# Patient Record
Sex: FEMALE | Race: BLACK OR AFRICAN AMERICAN | NOT HISPANIC OR LATINO | Employment: FULL TIME | URBAN - METROPOLITAN AREA
[De-identification: names, ages, dates, MRNs, and addresses within clinical notes are randomized per-mention and may not be internally consistent; named-entity substitution may affect disease eponyms.]

---

## 2023-10-31 PROBLEM — E11.9 CONTROLLED TYPE 2 DIABETES MELLITUS WITHOUT COMPLICATION, WITHOUT LONG-TERM CURRENT USE OF INSULIN: Status: ACTIVE | Noted: 2023-10-31

## 2023-12-04 PROBLEM — R79.82 ELEVATED C-REACTIVE PROTEIN (CRP): Status: ACTIVE | Noted: 2023-12-04

## 2024-01-08 ENCOUNTER — TELEPHONE (OUTPATIENT)
Dept: FAMILY MEDICINE | Facility: CLINIC | Age: 54
End: 2024-01-08
Payer: COMMERCIAL

## 2024-01-08 DIAGNOSIS — Z12.31 VISIT FOR SCREENING MAMMOGRAM: Primary | ICD-10-CM

## 2024-01-08 NOTE — TELEPHONE ENCOUNTER
----- Message from Cony Bales LPN sent at 1/8/2024  7:33 AM CST -----  Regarding: mammogram  Pt requesting order for mammogram Thanks  ----- Message -----  From: Laila Warren  Sent: 1/5/2024   3:04 PM CST  To: Magdi MAZARIEGOS Staff    .Type:  Mammogram    Caller is requesting to schedule their annual mammogram appointment.  Order is not listed in EPIC.  Please enter order and contact patient to schedule.  Name of Caller:pt  Where would they like the mammogram performed?Bre[facility] center across [facility]  Would the patient rather a call back or a response via [facility]?   Best Call Back Number:[phone]  Additional Information: asking for mmg orders she due

## 2024-01-31 ENCOUNTER — HOSPITAL ENCOUNTER (OUTPATIENT)
Dept: RADIOLOGY | Facility: HOSPITAL | Age: 54
Discharge: HOME | End: 2024-01-31
Attending: NURSE PRACTITIONER
Payer: COMMERCIAL

## 2024-01-31 DIAGNOSIS — Z12.39 ENCOUNTER FOR SCREENING FOR MALIGNANT NEOPLASM OF BREAST, UNSPECIFIED SCREENING MODALITY: ICD-10-CM

## 2024-01-31 PROCEDURE — 77067 SCR MAMMO BI INCL CAD: CPT | Mod: 26,,, | Performed by: RADIOLOGY

## 2024-01-31 PROCEDURE — 77067 SCR MAMMO BI INCL CAD: CPT | Mod: TC

## 2024-01-31 PROCEDURE — 77063 BREAST TOMOSYNTHESIS BI: CPT | Mod: 26,,, | Performed by: RADIOLOGY

## 2024-03-14 ENCOUNTER — LAB VISIT (OUTPATIENT)
Dept: LAB | Facility: HOSPITAL | Age: 54
End: 2024-03-14
Attending: STUDENT IN AN ORGANIZED HEALTH CARE EDUCATION/TRAINING PROGRAM
Payer: COMMERCIAL

## 2024-03-14 DIAGNOSIS — Z79.899 ENCOUNTER FOR LONG-TERM (CURRENT) USE OF OTHER MEDICATIONS: Primary | ICD-10-CM

## 2024-03-14 LAB
ALBUMIN SERPL-MCNC: 3.5 G/DL (ref 3.5–5)
ALBUMIN/GLOB SERPL: 0.9 RATIO (ref 1.1–2)
ALP SERPL-CCNC: 121 UNIT/L (ref 40–150)
ALT SERPL-CCNC: 29 UNIT/L (ref 0–55)
AST SERPL-CCNC: 19 UNIT/L (ref 5–34)
BILIRUB SERPL-MCNC: 0.3 MG/DL
BUN SERPL-MCNC: 9.6 MG/DL (ref 9.8–20.1)
CALCIUM SERPL-MCNC: 9.1 MG/DL (ref 8.4–10.2)
CHLORIDE SERPL-SCNC: 103 MMOL/L (ref 98–107)
CO2 SERPL-SCNC: 28 MMOL/L (ref 22–29)
CREAT SERPL-MCNC: 0.84 MG/DL (ref 0.55–1.02)
ERYTHROCYTE [DISTWIDTH] IN BLOOD BY AUTOMATED COUNT: 13.2 % (ref 11.5–17)
GFR SERPLBLD CREATININE-BSD FMLA CKD-EPI: >60 MLS/MIN/1.73/M2
GLOBULIN SER-MCNC: 4 GM/DL (ref 2.4–3.5)
GLUCOSE SERPL-MCNC: 144 MG/DL (ref 74–100)
HCT VFR BLD AUTO: 42.2 % (ref 37–47)
HGB BLD-MCNC: 13.7 G/DL (ref 12–16)
MCH RBC QN AUTO: 28.3 PG (ref 27–31)
MCHC RBC AUTO-ENTMCNC: 32.5 G/DL (ref 33–36)
MCV RBC AUTO: 87.2 FL (ref 80–94)
NRBC BLD AUTO-RTO: 0 %
PLATELET # BLD AUTO: 314 X10(3)/MCL (ref 130–400)
PMV BLD AUTO: 11.4 FL (ref 7.4–10.4)
POTASSIUM SERPL-SCNC: 4.4 MMOL/L (ref 3.5–5.1)
PROT SERPL-MCNC: 7.5 GM/DL (ref 6.4–8.3)
RBC # BLD AUTO: 4.84 X10(6)/MCL (ref 4.2–5.4)
SODIUM SERPL-SCNC: 141 MMOL/L (ref 136–145)
WBC # SPEC AUTO: 9.61 X10(3)/MCL (ref 4.5–11.5)

## 2024-03-14 PROCEDURE — 86480 TB TEST CELL IMMUN MEASURE: CPT

## 2024-03-14 PROCEDURE — 86803 HEPATITIS C AB TEST: CPT

## 2024-03-14 PROCEDURE — 86706 HEP B SURFACE ANTIBODY: CPT

## 2024-03-14 PROCEDURE — 87340 HEPATITIS B SURFACE AG IA: CPT

## 2024-03-14 PROCEDURE — 85027 COMPLETE CBC AUTOMATED: CPT

## 2024-03-14 PROCEDURE — 80053 COMPREHEN METABOLIC PANEL: CPT

## 2024-03-14 PROCEDURE — 86704 HEP B CORE ANTIBODY TOTAL: CPT

## 2024-03-14 PROCEDURE — 36415 COLL VENOUS BLD VENIPUNCTURE: CPT

## 2024-03-15 LAB
HBV CORE AB SERPL QL IA: NONREACTIVE
HBV SURFACE AB SER-ACNC: 214.88 MIU/ML
HBV SURFACE AB SERPL IA-ACNC: REACTIVE M[IU]/ML
HBV SURFACE AG SERPL QL IA: NONREACTIVE
HCV AB SERPL QL IA: NONREACTIVE

## 2024-03-17 LAB
GAMMA INTERFERON BACKGROUND BLD IA-ACNC: 0.05 IU/ML
M TB IFN-G BLD-IMP: NEGATIVE
M TB IFN-G CD4+ BCKGRND COR BLD-ACNC: 0.08 IU/ML
M TB IFN-G CD4+CD8+ BCKGRND COR BLD-ACNC: 0.07 IU/ML
MITOGEN IGNF BCKGRD COR BLD-ACNC: 9.95 IU/ML

## 2024-05-16 ENCOUNTER — HOSPITAL ENCOUNTER (EMERGENCY)
Facility: HOSPITAL | Age: 54
Discharge: HOME | End: 2024-05-16
Attending: EMERGENCY MEDICINE
Payer: COMMERCIAL

## 2024-05-16 VITALS
HEIGHT: 61 IN | TEMPERATURE: 98 F | RESPIRATION RATE: 22 BRPM | BODY MASS INDEX: 30.22 KG/M2 | HEART RATE: 74 BPM | OXYGEN SATURATION: 100 % | DIASTOLIC BLOOD PRESSURE: 90 MMHG | SYSTOLIC BLOOD PRESSURE: 161 MMHG | WEIGHT: 160.06 LBS

## 2024-05-16 DIAGNOSIS — S49.91XA INJURY OF RIGHT ACROMIOCLAVICULAR JOINT, INITIAL ENCOUNTER: Primary | ICD-10-CM

## 2024-05-16 LAB
OHS QRS DURATION: 74 MS
OHS QTC CALCULATION: 446 MS

## 2024-05-16 PROCEDURE — 93005 ELECTROCARDIOGRAM TRACING: CPT

## 2024-05-16 PROCEDURE — 99284 EMERGENCY DEPT VISIT MOD MDM: CPT | Mod: 25

## 2024-05-16 RX ORDER — MELOXICAM 7.5 MG/1
7.5 TABLET ORAL DAILY
Qty: 20 TABLET | Refills: 0 | Status: SHIPPED | OUTPATIENT
Start: 2024-05-16

## 2024-05-16 NOTE — Clinical Note
"Zonia Beltran" Felix was seen and treated in our emergency department on 5/16/2024.  She may return to work on 05/17/2024.       If you have any questions or concerns, please don't hesitate to call.      BRENDA HERNANDEZ    "

## 2024-05-16 NOTE — ED PROVIDER NOTES
Encounter Date: 5/16/2024       History     Chief Complaint   Patient presents with    Chest Pain     Right sided chest pain radiating to right arm and clavicle x2 days. Denies SOB or injury. No distress.      Pain to her right clavicle that extends into the right shoulder and breast.  She states it has been going on for 2 days.  She denies shortness of breath, nausea and vomiting.  She does not have any medical problems currently.  She has not currently taking any medications.  She denies any fall or trauma.  It hurts to lift her right arm.  She rates her pain 8/10.  Worse with movement.    The history is provided by the patient. No  was used.     Review of patient's allergies indicates:  No Known Allergies  History reviewed. No pertinent past medical history.  History reviewed. No pertinent surgical history.  No family history on file.     Review of Systems   Constitutional:  Negative for fever.   HENT:  Negative for sore throat.    Respiratory:  Negative for shortness of breath.    Cardiovascular:  Negative for chest pain.   Gastrointestinal:  Negative for nausea.   Genitourinary:  Negative for dysuria.   Musculoskeletal:  Positive for arthralgias and myalgias. Negative for back pain.   Skin:  Negative for rash.   Neurological:  Negative for weakness.   Hematological:  Does not bruise/bleed easily.       Physical Exam     Initial Vitals [05/16/24 0848]   BP Pulse Resp Temp SpO2   134/80 77 20 98 °F (36.7 °C) 100 %      MAP       --         Physical Exam    Nursing note and vitals reviewed.  Constitutional: She appears well-developed and well-nourished.   HENT:   Head: Normocephalic and atraumatic.   Eyes: Conjunctivae and EOM are normal. Pupils are equal, round, and reactive to light.   Neck: Neck supple.   Normal range of motion.  Cardiovascular:  Normal rate and regular rhythm.           Pulmonary/Chest: Breath sounds normal. No respiratory distress.   Abdominal: Abdomen is soft. Bowel  sounds are normal. She exhibits no distension. There is no abdominal tenderness.   Musculoskeletal:         General: No edema.      Right shoulder: Tenderness present. Decreased range of motion.      Left shoulder: Normal.      Cervical back: Normal range of motion and neck supple.      Comments: Patient has pain to the anterior shoulder with internal rotation.  Lift-off test to L1 with pain.  Pain to palpation of the clavicle.  Pain to palpation at the AC joint.  Pain to palpation over the lateral and posterior shoulder.     Neurological: She is alert and oriented to person, place, and time. She has normal strength.   Skin: Skin is warm and dry.   Psychiatric: Thought content normal.         ED Course   Procedures  Labs Reviewed - No data to display  EKG Readings: (Independently Interpreted)   Initial Reading: No STEMI. Rhythm: Normal Sinus Rhythm. Heart Rate: 72. Ectopy: No Ectopy. Conduction: Normal. ST Segments: Normal ST Segments. T Waves: Normal. Axis: Normal. Clinical Impression: Normal Sinus Rhythm     ECG Results              EKG 12-lead (Chest Pain) Age >30 (In process)        Collection Time Result Time QRS Duration OHS QTC Calculation    05/16/24 08:51:45 05/16/24 08:59:48 74 446                     In process by Interface, Lab In [facility] (05/16/24 08:59:54)                   Narrative:    Test Reason : R07.9,    Vent. Rate : 074 BPM     Atrial Rate : 074 BPM     P-R Int : 132 ms          QRS Dur : 074 ms      QT Int : 402 ms       P-R-T Axes : 071 079 045 degrees     QTc Int : 446 ms    Normal sinus rhythm  Normal ECG  No previous ECGs available    Referred By:             Confirmed By:                                   Imaging Results              X-Ray Shoulder Complete 2 View Right (Final result)  Result time 05/16/24 09:57:46      Final result by Sekou Pacheco MD (05/16/24 09:57:46)                   Impression:      Elevation of the clavicle in relation to the acromion might indicate a degree  of acromioclavicular injury.    No other significant abnormalities.      Electronically signed by: Sekou Pacheco  Date:    05/16/2024  Time:    09:57               Narrative:    EXAMINATION:  XR SHOULDER COMPLETE 2 OR MORE VIEWS RIGHT    CLINICAL HISTORY:  Pain, unspecified    COMPARISON:  None.    FINDINGS:  There is some elevation of the clavicle in relation to the acromion which may indicate a degree of acromioclavicular injury articular spaces are otherwise preserved with smooth articular surfaces    No acute displaced fractures or dislocations.    Joint spaces preserved.    No blastic or lytic lesions.    Soft tissues within normal limits.                                       X-Ray Chest PA And Lateral (Final result)  Result time 05/16/24 09:54:43      Final result by Sekou Pacheco MD (05/16/24 09:54:43)                   Impression:      No acute chest disease is identified.      Electronically signed by: Sekou Pacheco  Date:    05/16/2024  Time:    09:54               Narrative:    EXAMINATION:  XR CHEST PA AND LATERAL    CLINICAL HISTORY:  pain;, .    FINDINGS:  No alveolar consolidation, effusion, or pneumothorax is seen.   The thoracic aorta is normal  cardiac silhouette, central pulmonary vessels and mediastinum are normal in size and are grossly unremarkable.   visualized osseous structures are grossly unremarkable.                                    X-Rays:   Independently Interpreted Readings:   Other Readings:  No acute fracture or dislocation.    Medications - No data to display  Medical Decision Making  Patient with right shoulder pain for 2 days without injury presents to the ED. x-ray showed no acute injury however x-ray showed mild elevation of the clavicle on the right shoulder x-ray.  Possible AC joint injury.  I will give her an anti-inflammatory and a shoulder sling.  She is to follow up with Orthopedic surgery.    Amount and/or Complexity of Data Reviewed  Radiology: ordered  and independent interpretation performed. Decision-making details documented in ED Course.     Details: No acute fracture or dislocation.  Mild elevation of the right clavicle.    Risk  Prescription drug management.  Risk Details: Risk Details: Given strict ED return precautions. I have spoken with the patient and/or caregivers. I have explained the patient's condition, diagnoses and treatment plan based on the information available to me at this time. I have answered the patient's and/or caregiver's questions and addressed any concerns. The patient and/or caregivers have as good an understanding of the patient's diagnosis, condition and treatment plan as can be expected at this point. The vital signs have been stable. The patient's condition is stable and appropriate for discharge from the emergency department.      The patient will pursue further outpatient evaluation with the primary care physician or other designated or consulting physician as outlined in the discharge instructions. The patient and/or caregivers are agreeable to this plan of care and follow-up instructions have been explained in detail. The patient and/or caregivers have received these instructions in written format and have expressed an understanding of the discharge instructions. The patient and/or caregivers are aware that any significant change in condition or worsening of symptoms should prompt an immediate return to this or the closest emergency department or a call to 911.           Additional MDM:   Differential Diagnosis:   AC joint tear, rotator cuff tear, rotator cuff syndrome                                  It is important that you follow up with your primary care provider or specialist if indicated for further evaluation, workup, and treatment as necessary. The exam and treatment you received in Emergency Department was for an urgent problem and NOT INTENDED AS COMPLETE CARE. It is important that you FOLLOW UP with a doctor for  ongoing care. If your symptoms become WORSE or you DO NOT IMPROVE and you are unable to reach your health care provider, you should RETURN to the Emergency Department    Clinical Impression:  Final diagnoses:  [S49.91XA] Injury of right acromioclavicular joint, initial encounter (Primary)          ED Disposition Condition    Discharge Stable          ED Prescriptions       Medication Sig Dispense Start Date End Date Auth. Provider    meloxicam (MOBIC) 7.5 MG tablet Take 1 tablet (7.5 mg total) by mouth once daily. 20 tablet 5/16/2024 -- Chasidy Brewer FNP          Follow-up Information       Follow up With Specialties Details Why Contact Info    [facility] Call today  [address] [facility] [address] [phone]             Chasidy Brewer FNP  05/16/24 1001

## 2024-05-23 ENCOUNTER — OFFICE VISIT (OUTPATIENT)
Dept: URGENT CARE | Facility: CLINIC | Age: 54
End: 2024-05-23
Payer: COMMERCIAL

## 2024-05-23 VITALS
RESPIRATION RATE: 18 BRPM | TEMPERATURE: 99 F | DIASTOLIC BLOOD PRESSURE: 82 MMHG | OXYGEN SATURATION: 99 % | HEART RATE: 91 BPM | SYSTOLIC BLOOD PRESSURE: 118 MMHG | BODY MASS INDEX: 30.22 KG/M2 | HEIGHT: 61 IN | WEIGHT: 160.06 LBS

## 2024-05-23 DIAGNOSIS — L30.9 HAND DERMATITIS: Primary | ICD-10-CM

## 2024-05-23 PROCEDURE — 99215 OFFICE O/P EST HI 40 MIN: CPT | Mod: PBBFAC | Performed by: FAMILY MEDICINE

## 2024-05-23 PROCEDURE — 99213 OFFICE O/P EST LOW 20 MIN: CPT | Mod: S$PBB,,, | Performed by: FAMILY MEDICINE

## 2024-05-23 NOTE — PROGRESS NOTES
"Subjective:       Patient ID: Zonia Washington is a 53 y.o. female.    Vitals:  height is 5' 1" (1.549 m) and weight is 72.6 kg (160 lb 0.9 oz). Her temperature is 98.7 °F (37.1 °C). Her blood pressure is 118/82 and her pulse is 91. Her respiration is 18 and oxygen saturation is 99%.     Chief Complaint: Rash (Multiple small "bumps" bilat palms x 4days )    Patient presents to urgent care clinic with several days of pustules on bilateral palms.  Asymptomatic, no pruritus, no drainage.  Interestingly, has a history of foot psoriasis, also possible onychomycosis?  Treated by a podiatrist and derm.    All other systems are negative    Chart reviewed    Objective:   Physical Exam   Constitutional: She appears well-developed.  Non-toxic appearance. She does not appear ill. No distress.   Abdominal: Normal appearance.   Neurological: She is alert.   Skin: Skin is not diaphoretic.         Comments: Bilateral hands-rare scattered 1-2 mm size pustules, nontender, no vesicles.    Right foot-psoriasiform changes of the entire plantar surface in a moccasin type pattern, dystrophic nails, no interdigital findings   Nursing note and vitals reviewed.        Assessment:     1. Hand dermatitis            Plan:   Potential etiologies are dyshidrotic eczema, possibly pustular psoriasis, viral, etc..  Patient is currently asymptomatic.  She is followed by a dermatologist.  I encouraged her to monitor these areas, follow-up with her PCP, but return to urgent care for recheck if there is progression or new symptoms development.      Hand dermatitis        Please note: This chart was completed via voice to text dictation. It may contain typographical/word recognition errors. If there are any questions, please contact the provider for final clarification.    "

## 2024-05-28 ENCOUNTER — TELEPHONE (OUTPATIENT)
Dept: FAMILY MEDICINE | Facility: CLINIC | Age: 54
End: 2024-05-28
Payer: COMMERCIAL

## 2024-05-28 ENCOUNTER — OFFICE VISIT (OUTPATIENT)
Dept: ORTHOPEDICS | Facility: CLINIC | Age: 54
End: 2024-05-28
Payer: COMMERCIAL

## 2024-05-28 ENCOUNTER — PATIENT MESSAGE (OUTPATIENT)
Dept: ORTHOPEDICS | Facility: CLINIC | Age: 54
End: 2024-05-28

## 2024-05-28 VITALS
BODY MASS INDEX: 29.44 KG/M2 | DIASTOLIC BLOOD PRESSURE: 71 MMHG | SYSTOLIC BLOOD PRESSURE: 113 MMHG | WEIGHT: 160 LBS | HEIGHT: 62 IN | HEART RATE: 75 BPM

## 2024-05-28 DIAGNOSIS — G89.29 CHEST WALL PAIN, CHRONIC: ICD-10-CM

## 2024-05-28 DIAGNOSIS — S49.91XA INJURY OF RIGHT ACROMIOCLAVICULAR JOINT, INITIAL ENCOUNTER: ICD-10-CM

## 2024-05-28 DIAGNOSIS — R07.89 RIGHT-SIDED CHEST WALL PAIN: Primary | ICD-10-CM

## 2024-05-28 DIAGNOSIS — R07.89 CHEST WALL PAIN, CHRONIC: ICD-10-CM

## 2024-05-28 PROCEDURE — 3074F SYST BP LT 130 MM HG: CPT | Mod: CPTII,,, | Performed by: ORTHOPAEDIC SURGERY

## 2024-05-28 PROCEDURE — 3078F DIAST BP <80 MM HG: CPT | Mod: CPTII,,, | Performed by: ORTHOPAEDIC SURGERY

## 2024-05-28 PROCEDURE — 3008F BODY MASS INDEX DOCD: CPT | Mod: CPTII,,, | Performed by: ORTHOPAEDIC SURGERY

## 2024-05-28 PROCEDURE — 99204 OFFICE O/P NEW MOD 45 MIN: CPT | Mod: ,,, | Performed by: ORTHOPAEDIC SURGERY

## 2024-05-28 PROCEDURE — 1160F RVW MEDS BY RX/DR IN RCRD: CPT | Mod: CPTII,,, | Performed by: ORTHOPAEDIC SURGERY

## 2024-05-28 PROCEDURE — 1159F MED LIST DOCD IN RCRD: CPT | Mod: CPTII,,, | Performed by: ORTHOPAEDIC SURGERY

## 2024-05-28 RX ORDER — GABAPENTIN 300 MG/1
300 CAPSULE ORAL NIGHTLY
Qty: 20 CAPSULE | Refills: 0 | Status: SHIPPED | OUTPATIENT
Start: 2024-05-28

## 2024-05-28 NOTE — PROGRESS NOTES
Orthopaedic Clinic  Orthopedic Clinic Note      Chief Complaint:   Chief Complaint   Patient presents with    Appointment     Patient here today for right shoulder pain since about 5/14/2024. ER visit on 05/16/24 where heart issues were ruled out and she was given a sling to wear for support. She thinks that she may have slept bad that night. She is right hand dominant and the pain keeps her from being able to do anything with that arm. Pain remains 8/10 and is keeping her up at night.      Referring Physician: Chasidy Brewer FNP      History of Present Illness:    This is a 53 y.o. year old female presenting with complaints of right shoulder pain acutely worsening since 05/14/2024.  She denies any injury or trauma to the shoulder.  States that she did have the same symptoms a few months ago that resolved with physical therapy.  Pain is most notable over the right chest wall and extends into the right breast and axilla and over the posterior aspect of the shoulder.  It is causing difficulty with sleeping.  She denies any issues with range of motion of the shoulder.  She has attempted prescribed meloxicam and physical therapy with continued symptoms.  She was evaluated in the ED for the same complaint on 05/16/2024 where cardiac etiology was ruled out.  X-rays of both the chest and right shoulder did not demonstrate any acute pathology.  She was placed in a sling and instructed to follow up as an outpatient with Orthopedics.      Past Medical History:   Diagnosis Date    Mixed hyperlipidemia 10/31/2023    Obesity, unspecified     Psoriasis 5/29/2022    Type 2 diabetes mellitus without complications        Past Surgical History:   Procedure Laterality Date    CHOLECYSTECTOMY      DILATION AND CURETTAGE OF UTERUS      HERNIA REPAIR      HERNIA REPAIR      HYSTERECTOMY         Current Outpatient Medications   Medication Sig    aspirin (ECOTRIN) 81 MG EC tablet Take 81 mg by mouth.    cetirizine (ZYRTEC) 10 MG  tablet Take 10 mg by mouth.    cholecalciferol, vitamin D3, (VITAMIN D3) 10 mcg (400 unit) Tab     diclofenac sodium (VOLTAREN) 1 % Gel Apply 2 g topically 4 (four) times daily as needed (pain/inflammation).    meloxicam (MOBIC) 7.5 MG tablet Take 1 tablet (7.5 mg total) by mouth once daily.    multivit with min-folic acid (ONE-A-DAY WOMEN'S 50 PLUS) 0.4 mg Tab     rosuvastatin (CRESTOR) 5 MG tablet Take 1 tablet (5 mg total) by mouth nightly.    gabapentin (NEURONTIN) 300 MG capsule Take 1 capsule (300 mg total) by mouth every evening.     No current facility-administered medications for this visit.       Review of patient's allergies indicates:   Allergen Reactions    Latex Hives, Itching and Rash       Family History   Problem Relation Name Age of Onset    Diabetes Mother Niecee Felix     Cancer Mother Niecee Felix     Cancer Father Carlos Murphy        Social History     Socioeconomic History    Marital status:    Tobacco Use    Smoking status: Never    Smokeless tobacco: Never   Substance and Sexual Activity    Alcohol use: Yes     Comment: Occasionally    Drug use: Never    Sexual activity: Not Currently     Partners: Male     Birth control/protection: None   Social History Narrative    ** Merged History Encounter **          Social Determinants of Health     Financial Resource Strain: Low Risk  (11/29/2023)    Overall Financial Resource Strain (CARDIA)     Difficulty of Paying Living Expenses: Not hard at all   Food Insecurity: No Food Insecurity (11/29/2023)    Hunger Vital Sign     Worried About Running Out of Food in the Last Year: Never true     Ran Out of Food in the Last Year: Never true   Transportation Needs: No Transportation Needs (11/29/2023)    PRAPARE - Transportation     Lack of Transportation (Medical): No     Lack of Transportation (Non-Medical): No   Physical Activity: Insufficiently Active (11/29/2023)    Exercise Vital Sign     Days of Exercise per Week: 5 days     Minutes of Exercise  "per Session: 10 min   Stress: No Stress Concern Present (11/29/2023)    [employer] - Occupational Stress Questionnaire     Feeling of Stress : Not at all   Housing Stability: Low Risk  (11/29/2023)    Housing Stability Vital Sign     Unable to Pay for Housing in the Last Year: No     Number of Places Lived in the Last Year: 1     Unstable Housing in the Last Year: No           Review of Systems:  All review of systems negative except for those stated in the HPI.    Examination:    Vital Signs:    Vitals:    05/28/24 0812   BP: 113/71   Pulse: 75   Weight: 72.6 kg (160 lb)   Height: 5' 1.81" (1.57 m)       Body mass index is 29.44 kg/m².    Physical Examination:  General: Well-developed, well-nourished.  Neuro: Alert and oriented x 3.  Psych: Normal mood and affect.  Card: Regular rate and rhythm  Resp: Respirations regular and unlabored  Right Shoulder Exam:  No obvious deformity. No medial or lateral scapula winging. Forward flexion to 170 degrees and abduction to 170 degrees and external rotation 80 degrees is and internal rotation is 80 degrees. Negative empty can, Whipple, Drop Arm Test, Thompson, impingement, AC joint tenderness. Negative biceps groove tenderness, Heck´s, Yergason´s, Speed test. Negative Apprehension and Relocation test. 5/5 strength, normal skin appearance and palpable pulses distally. Sensibility normal.      Imaging:  Prior four views of the right shoulder demonstrate questionable AC joint injury.    Assessment: Right-sided chest wall pain  -     gabapentin (NEURONTIN) 300 MG capsule; Take 1 capsule (300 mg total) by mouth every evening.  Dispense: 20 capsule; Refill: 0  -     CT Chest Without Contrast; Future; Expected date: 05/28/2024    Chest wall pain, chronic  -     gabapentin (NEURONTIN) 300 MG capsule; Take 1 capsule (300 mg total) by mouth every evening.  Dispense: 20 capsule; Refill: 0  -     CT Chest Without Contrast; Future; Expected date: " 05/28/2024    Injury of right acromioclavicular joint, initial encounter  -     Ambulatory referral/consult to Orthopedics        Plan:  Prior x-rays were reviewed with the patient.  Unfortunately, her x-ray imaging has been nondiagnostic.  The AC joint injury demonstrated on shoulder radiographs is likely incidental as her symptoms do not fit the usual presentation for this diagnosis.  I am concerned that her symptoms are stemming from some type of chest wall pathology or thoracic spine issue.  For that reason, order entered for CT of the chest.  Prescription routed for gabapentin to serve as both a therapeutic and diagnostic intervention.  She will discontinue her sling.  She will return to clinic for review of CT results once imaging is obtained.  She verbalized understanding of the plan of care with no further questions.    Thony Keen MD personally performed the services described in this documentation, including but not limited to patient's history, physical examination, and assessment and plan of care. All medical record entries made by DHAVAL Veras were performed at his direction and in his presence. The medical record was reviewed and is accurate and complete.         Follow up for Review of CT results.      DISCLAIMER: This note may have been dictated using voice recognition software and may contain grammatical errors.     NOTE: Consult report sent to referring provider via [facility].

## 2024-05-28 NOTE — TELEPHONE ENCOUNTER
Left message confirming pts appt 06/04 @ 9:00    Are there any outstanding tasks in the patients's chart (ex.labs,MM,etc)?  no  Do we have outstanding/pending referrals?  no  Has the patient been seen in and ER,UCC, or been admitted since last visit?  yes  Has the patient seen any other health care provider(doctors) since last visit?  yes  Has the patient had any bloodwork or x-rays done since last visit?   yes   <<-----Click here for Discharge Medication Review

## 2024-05-29 DIAGNOSIS — R91.1 SOLITARY PULMONARY NODULE: Primary | ICD-10-CM

## 2024-05-29 DIAGNOSIS — K76.0 FATTY LIVER: ICD-10-CM

## 2024-05-29 DIAGNOSIS — R91.8 MULTIPLE LUNG NODULES ON CT: ICD-10-CM

## 2024-05-30 ENCOUNTER — TELEPHONE (OUTPATIENT)
Dept: FAMILY MEDICINE | Facility: CLINIC | Age: 54
End: 2024-05-30
Payer: COMMERCIAL

## 2024-05-30 NOTE — TELEPHONE ENCOUNTER
----- Message from Zara Fairbanks MD sent at 5/29/2024  5:54 PM CDT -----  CT chest shows multiple small nodular densities along the periphery of both lower lungs, most consistent with post infectious/inflammatory process and age indeterminate.  A follow-up non-contrast chest CT is recommended in 3 months to assess for stability, orders are in file and I placed a referral to a Pulmonologist (lung specialist). The Radiologist suspects the changes are chronic.  No large pulmonary nodule or mass.  No additional pathology at the chest.  - She has diffuse fatty deposits in her liver, she needs to follow low fat diet, exercise, and 10% weight loss for reversal.   - Remaining CT chest is normal.

## 2024-06-04 ENCOUNTER — OFFICE VISIT (OUTPATIENT)
Dept: FAMILY MEDICINE | Facility: CLINIC | Age: 54
End: 2024-06-04
Payer: COMMERCIAL

## 2024-06-04 ENCOUNTER — CLINICAL SUPPORT (OUTPATIENT)
Dept: FAMILY MEDICINE | Facility: CLINIC | Age: 54
End: 2024-06-04
Attending: FAMILY MEDICINE
Payer: COMMERCIAL

## 2024-06-04 ENCOUNTER — LAB VISIT (OUTPATIENT)
Dept: LAB | Facility: HOSPITAL | Age: 54
End: 2024-06-04
Attending: FAMILY MEDICINE
Payer: COMMERCIAL

## 2024-06-04 ENCOUNTER — TELEPHONE (OUTPATIENT)
Dept: FAMILY MEDICINE | Facility: CLINIC | Age: 54
End: 2024-06-04

## 2024-06-04 VITALS
OXYGEN SATURATION: 99 % | SYSTOLIC BLOOD PRESSURE: 122 MMHG | HEART RATE: 76 BPM | DIASTOLIC BLOOD PRESSURE: 82 MMHG | RESPIRATION RATE: 16 BRPM | WEIGHT: 156 LBS | BODY MASS INDEX: 29.45 KG/M2 | HEIGHT: 61 IN

## 2024-06-04 DIAGNOSIS — E11.9 CONTROLLED TYPE 2 DIABETES MELLITUS WITHOUT COMPLICATION, WITHOUT LONG-TERM CURRENT USE OF INSULIN: Primary | ICD-10-CM

## 2024-06-04 DIAGNOSIS — M47.22 OSTEOARTHRITIS OF SPINE WITH RADICULOPATHY, CERVICAL REGION: ICD-10-CM

## 2024-06-04 DIAGNOSIS — E78.5 HYPERLIPIDEMIA ASSOCIATED WITH TYPE 2 DIABETES MELLITUS: ICD-10-CM

## 2024-06-04 DIAGNOSIS — E11.69 HYPERLIPIDEMIA ASSOCIATED WITH TYPE 2 DIABETES MELLITUS: ICD-10-CM

## 2024-06-04 DIAGNOSIS — E11.9 CONTROLLED TYPE 2 DIABETES MELLITUS WITHOUT COMPLICATION, WITHOUT LONG-TERM CURRENT USE OF INSULIN: ICD-10-CM

## 2024-06-04 DIAGNOSIS — G89.29 CHRONIC RIGHT SHOULDER PAIN: ICD-10-CM

## 2024-06-04 DIAGNOSIS — D75.1 POLYCYTHEMIA: Primary | ICD-10-CM

## 2024-06-04 DIAGNOSIS — M25.511 CHRONIC RIGHT SHOULDER PAIN: ICD-10-CM

## 2024-06-04 LAB
ALBUMIN SERPL-MCNC: 4 G/DL (ref 3.5–5)
ALBUMIN/GLOB SERPL: 0.9 RATIO (ref 1.1–2)
ALP SERPL-CCNC: 149 UNIT/L (ref 40–150)
ALT SERPL-CCNC: 20 UNIT/L (ref 0–55)
ANION GAP SERPL CALC-SCNC: 8 MEQ/L
AST SERPL-CCNC: 19 UNIT/L (ref 5–34)
BASOPHILS # BLD AUTO: 0.04 X10(3)/MCL
BASOPHILS NFR BLD AUTO: 0.4 %
BILIRUB SERPL-MCNC: 0.4 MG/DL
BUN SERPL-MCNC: 15.9 MG/DL (ref 9.8–20.1)
CALCIUM SERPL-MCNC: 9.3 MG/DL (ref 8.4–10.2)
CHLORIDE SERPL-SCNC: 109 MMOL/L (ref 98–107)
CHOLEST SERPL-MCNC: 127 MG/DL
CHOLEST/HDLC SERPL: 3 {RATIO} (ref 0–5)
CK SERPL-CCNC: 50 U/L (ref 29–168)
CO2 SERPL-SCNC: 24 MMOL/L (ref 22–29)
CREAT SERPL-MCNC: 0.91 MG/DL (ref 0.55–1.02)
CREAT/UREA NIT SERPL: 17
EOSINOPHIL # BLD AUTO: 0.21 X10(3)/MCL (ref 0–0.9)
EOSINOPHIL NFR BLD AUTO: 2 %
ERYTHROCYTE [DISTWIDTH] IN BLOOD BY AUTOMATED COUNT: 12.4 % (ref 11.5–17)
EST. AVERAGE GLUCOSE BLD GHB EST-MCNC: 154.2 MG/DL
GFR SERPLBLD CREATININE-BSD FMLA CKD-EPI: >60 ML/MIN/1.73/M2
GLOBULIN SER-MCNC: 4.7 GM/DL (ref 2.4–3.5)
GLUCOSE SERPL-MCNC: 128 MG/DL (ref 74–100)
HBA1C MFR BLD: 7 %
HCT VFR BLD AUTO: 51.6 % (ref 37–47)
HDLC SERPL-MCNC: 46 MG/DL (ref 35–60)
HGB BLD-MCNC: 15.3 G/DL (ref 12–16)
IMM GRANULOCYTES # BLD AUTO: 0.05 X10(3)/MCL (ref 0–0.04)
IMM GRANULOCYTES NFR BLD AUTO: 0.5 %
LDLC SERPL CALC-MCNC: 58 MG/DL (ref 50–140)
LYMPHOCYTES # BLD AUTO: 3.07 X10(3)/MCL (ref 0.6–4.6)
LYMPHOCYTES NFR BLD AUTO: 29.5 %
MCH RBC QN AUTO: 28 PG (ref 27–31)
MCHC RBC AUTO-ENTMCNC: 29.7 G/DL (ref 33–36)
MCV RBC AUTO: 94.3 FL (ref 80–94)
MONOCYTES # BLD AUTO: 0.67 X10(3)/MCL (ref 0.1–1.3)
MONOCYTES NFR BLD AUTO: 6.4 %
NEUTROPHILS # BLD AUTO: 6.36 X10(3)/MCL (ref 2.1–9.2)
NEUTROPHILS NFR BLD AUTO: 61.2 %
NRBC BLD AUTO-RTO: 0 %
PLATELET # BLD AUTO: 310 X10(3)/MCL (ref 130–400)
PLATELETS.RETICULATED NFR BLD AUTO: 10.4 % (ref 0.9–11.2)
PMV BLD AUTO: 12.2 FL (ref 7.4–10.4)
POTASSIUM SERPL-SCNC: 5 MMOL/L (ref 3.5–5.1)
PROT SERPL-MCNC: 8.7 GM/DL (ref 6.4–8.3)
RBC # BLD AUTO: 5.47 X10(6)/MCL (ref 4.2–5.4)
SODIUM SERPL-SCNC: 141 MMOL/L (ref 136–145)
TRIGL SERPL-MCNC: 114 MG/DL (ref 37–140)
VLDLC SERPL CALC-MCNC: 23 MG/DL
WBC # SPEC AUTO: 10.4 X10(3)/MCL (ref 4.5–11.5)

## 2024-06-04 PROCEDURE — 92228 IMG RTA DETC/MNTR DS PHY/QHP: CPT | Mod: 26,,, | Performed by: OPTOMETRIST

## 2024-06-04 PROCEDURE — 3074F SYST BP LT 130 MM HG: CPT | Mod: CPTII,,, | Performed by: FAMILY MEDICINE

## 2024-06-04 PROCEDURE — 80053 COMPREHEN METABOLIC PANEL: CPT

## 2024-06-04 PROCEDURE — 1160F RVW MEDS BY RX/DR IN RCRD: CPT | Mod: CPTII,,, | Performed by: FAMILY MEDICINE

## 2024-06-04 PROCEDURE — 36415 COLL VENOUS BLD VENIPUNCTURE: CPT

## 2024-06-04 PROCEDURE — 3079F DIAST BP 80-89 MM HG: CPT | Mod: CPTII,,, | Performed by: FAMILY MEDICINE

## 2024-06-04 PROCEDURE — 83036 HEMOGLOBIN GLYCOSYLATED A1C: CPT

## 2024-06-04 PROCEDURE — 80061 LIPID PANEL: CPT

## 2024-06-04 PROCEDURE — 3051F HG A1C>EQUAL 7.0%<8.0%: CPT | Mod: CPTII,,, | Performed by: FAMILY MEDICINE

## 2024-06-04 PROCEDURE — 85025 COMPLETE CBC W/AUTO DIFF WBC: CPT

## 2024-06-04 PROCEDURE — 2025F 7 FLD RTA PHOTO W/O RTNOPTHY: CPT | Mod: CPTII,,, | Performed by: OPTOMETRIST

## 2024-06-04 PROCEDURE — 99214 OFFICE O/P EST MOD 30 MIN: CPT | Mod: ,,, | Performed by: FAMILY MEDICINE

## 2024-06-04 PROCEDURE — 82550 ASSAY OF CK (CPK): CPT

## 2024-06-04 PROCEDURE — 3008F BODY MASS INDEX DOCD: CPT | Mod: CPTII,,, | Performed by: FAMILY MEDICINE

## 2024-06-04 PROCEDURE — 1159F MED LIST DOCD IN RCRD: CPT | Mod: CPTII,,, | Performed by: FAMILY MEDICINE

## 2024-06-04 PROCEDURE — 92228 IMG RTA DETC/MNTR DS PHY/QHP: CPT | Mod: TC,,, | Performed by: FAMILY MEDICINE

## 2024-06-04 RX ORDER — FLUCONAZOLE 100 MG/1
100 TABLET ORAL
COMMUNITY

## 2024-06-04 RX ORDER — ROSUVASTATIN CALCIUM 5 MG/1
5 TABLET, COATED ORAL NIGHTLY
Qty: 90 TABLET | Refills: 3 | Status: SHIPPED | OUTPATIENT
Start: 2024-06-04 | End: 2025-06-04

## 2024-06-04 RX ORDER — METHYLPREDNISOLONE 4 MG/1
TABLET ORAL
Qty: 21 EACH | Refills: 0 | Status: SHIPPED | OUTPATIENT
Start: 2024-06-04 | End: 2024-06-18

## 2024-06-04 NOTE — TELEPHONE ENCOUNTER
----- Message from Domingo Padgett sent at 6/4/2024  2:58 PM CDT -----  .Type:  Needs Medical Advice    Who Called: Zonia  Symptoms (please be specific):    How long has patient had these symptoms:    Pharmacy name and phone #:    Would the patient rather a call back or a response via [facility]?   Best Call Back Number: [phone]  Additional Information: She was returning a call back from the nurse re: her blood test results.

## 2024-06-04 NOTE — PROGRESS NOTES
Patient ID: [id number]     Chief Complaint: Diabetes        HPI:     Zonia Washington is a 53 y.o. female here today for a follow up DM II.   - DM II is controlled with diet, asymptomatic, she wears eyeglasses, she needs DM II eye exam today, she does see Dr. Marvin as well. She is due for labs.  She does see Podiatry (Dr. Saenz) for DM II foot care and Dermatology (Dr. Corcoran) for psoriasis treatment, trying different injections, currently on Tremfya.    - HLD is controlled with Rx, no side effects, asymptomatic, she needs a refill of Crestor today.   - Patient complains of neck pain and right shoulder pain radiating to right chest wall and right elbow x 12/2023. She denies injury. Pain is 10/10 on pain scale, worse with movement, she denies popping, reports mild swelling across her collar bone. She was seen at [facility] ER on 05/16/2024 due to these symptoms as well as her Ortho (Dr. Keen) and had CT chest that showed postinfectious/postinflammatory lung nodules, repeat CT chest in 3 months and she is awaiting Pulmonary appointment. She has tried Mobic, Gabapentin, and Voltaren Gel. She has tried PT for 12 weeks without resolution of pain. She has DJD of right shoulder and neck seen on Xray in 12/2023, hasn't had MRI of either.   - Patient is without any other complaints today.         -------------------------------------    Mixed hyperlipidemia    Obesity, unspecified    Psoriasis    Type 2 diabetes mellitus without complications        Past Surgical History:   Procedure Laterality Date    CHOLECYSTECTOMY      DILATION AND CURETTAGE OF UTERUS      HERNIA REPAIR      HERNIA REPAIR      HYSTERECTOMY         Review of patient's allergies indicates:   Allergen Reactions    Latex Hives, Itching and Rash       Outpatient Medications Marked as Taking for the 6/4/24 encounter (Office Visit) with Zara Fairbanks MD   Medication Sig Dispense Refill    aspirin (ECOTRIN) 81 MG EC tablet Take 81 mg by mouth.      cetirizine  (ZYRTEC) 10 MG tablet Take 10 mg by mouth.      cholecalciferol, vitamin D3, (VITAMIN D3) 10 mcg (400 unit) Tab       diclofenac sodium (VOLTAREN) 1 % Gel Apply 2 g topically 4 (four) times daily as needed (pain/inflammation). 100 g 1    fluconazole (DIFLUCAN) 100 MG tablet Take 100 mg by mouth. 3 tablets weekly for five weeks      gabapentin (NEURONTIN) 300 MG capsule Take 1 capsule (300 mg total) by mouth every evening. 20 capsule 0    meloxicam (MOBIC) 7.5 MG tablet Take 1 tablet (7.5 mg total) by mouth once daily. 20 tablet 0    multivit with min-folic acid (ONE-A-DAY WOMEN'S 50 PLUS) 0.4 mg Tab       [DISCONTINUED] rosuvastatin (CRESTOR) 5 MG tablet Take 1 tablet (5 mg total) by mouth nightly. 90 tablet 1       Social History     Socioeconomic History    Marital status:    Tobacco Use    Smoking status: Never    Smokeless tobacco: Never   Substance and Sexual Activity    Alcohol use: Yes     Comment: Occasionally    Drug use: Never    Sexual activity: Not Currently     Partners: Male     Birth control/protection: None   Social History Narrative    ** Merged History Encounter **          Social Determinants of Health     Financial Resource Strain: Low Risk  (11/29/2023)    Overall Financial Resource Strain (CARDIA)     Difficulty of Paying Living Expenses: Not hard at all   Food Insecurity: No Food Insecurity (11/29/2023)    Hunger Vital Sign     Worried About Running Out of Food in the Last Year: Never true     Ran Out of Food in the Last Year: Never true   Transportation Needs: No Transportation Needs (11/29/2023)    PRAPARE - Transportation     Lack of Transportation (Medical): No     Lack of Transportation (Non-Medical): No   Physical Activity: Insufficiently Active (11/29/2023)    Exercise Vital Sign     Days of Exercise per Week: 5 days     Minutes of Exercise per Session: 10 min   Stress: No Stress Concern Present (11/29/2023)    [employer] [place] [employer] - Occupational Stress  "Questionnaire     Feeling of Stress : Not at all   Housing Stability: Low Risk  (11/29/2023)    Housing Stability Vital Sign     Unable to Pay for Housing in the Last Year: No     Number of Places Lived in the Last Year: 1     Unstable Housing in the Last Year: No        Family History   Problem Relation Name Age of Onset    Diabetes Mother Niecee Felix     Cancer Mother Gaileceviraj Washington     Cancer Father Carlos Murphy         Subjective:       Review of Systems:    See HPI for details    Constitutional: Denies Change in appetite. Denies Chills. Denies Fever. Denies Night sweats.  Eye: Denies Blurred vision. Denies Discharge. Denies Eye pain.  ENT: Denies Decreased hearing. Denies Sore throat. Denies Swollen glands.  Respiratory: Denies Cough. Denies Shortness of breath. Denies Shortness of breath with exertion. Denies Wheezing.  Cardiovascular: Denies Chest pain at rest. Denies Chest pain with exertion. Denies Irregular heartbeat. Denies Palpitations.  Gastrointestinal: Denies Abdominal pain. Denies Diarrhea. Denies Nausea. Denies Vomiting. Denies Hematemesis or Hematochezia.  Genitourinary: Denies Dysuria. Denies Urinary frequency. Denies Urinary urgency. Denies Blood in urine.  Endocrine: Denies Cold intolerance. Denies Excessive thirst. Denies Heat intolerance. Denies Weight loss. Denies Weight gain.  Musculoskeletal: Reports Painful joints. Denies Weakness.  Integumentary: Denies Rash. Denies Itching. Denies Dry skin.  Neurologic: Denies Dizziness. Denies Fainting. Denies Headache.  Psychiatric: Denies Depression. Denies Anxiety. Denies Suicidal/Homicidal ideations.    All Other ROS: Negative except as stated in HPI.       Objective:     /82 (BP Location: Left arm, Patient Position: Sitting, BP Method: Medium (Automatic))   Pulse 76   Resp 16   Ht 5' 1" (1.549 m)   Wt 70.8 kg (156 lb)   SpO2 99%   BMI 29.48 kg/m²     Physical Exam    General: Alert and oriented, No acute distress, Obese.    Eye: Pupils are " equal, round and reactive to light, Extraocular movements are intact, Normal conjunctiva.   HENT: Normocephalic, Tympanic membranes are clear, Normal hearing, Oral mucosa is moist, No pharyngeal erythema.   Throat: Pharynx ( Not edematous, No exudate ).   Neck: Supple, Non-tender, No carotid bruit, No lymphadenopathy, No thyromegaly.   Respiratory: Lungs are clear to auscultation, Respirations are non-labored, Breath sounds are equal, Symmetrical chest wall expansion, No chest wall tenderness.   Cardiovascular: Normal rate, Regular rhythm, No murmur, Good pulses equal in all extremities, No edema.   Gastrointestinal: Soft, Non-tender, Non-distended, Normal bowel sounds, No organomegaly.   Genitourinary: No costovertebral angle tenderness.   Musculoskeletal: Normal range of motion, Normal gait. Neck with FROM, NT, no erythema, no effusion, no deformity. Right shoulder with mild TTP along posterior shoulder, mild crepitus, no erythema, no effusion, no deformity.   Neurologic: No focal deficits, Cranial Nerves II-XII are grossly intact.   Psychiatric: Cooperative, Appropriate mood & affect, Normal judgment, Non-suicidal.   Mood and affect: Calm.   Behavior: Relaxed.      *Records from [facility] ER visit on 05/16/2024 and Ortho visit on 05/28/2024 were reviewed and discussed with patient and patient voices understanding.*        Assessment:       ICD-10-CM ICD-9-CM   1. Controlled type 2 diabetes mellitus without complication, without long-term current use of insulin  E11.9 250.00   2. Hyperlipidemia associated with type 2 diabetes mellitus  E11.69 250.80    E78.5 272.4   3. Chronic right shoulder pain  M25.511 719.41    G89.29 338.29   4. Osteoarthritis of spine with radiculopathy, cervical region  M47.22 721.0        Plan:     Problem List Items Addressed This Visit          Endocrine    Controlled type 2 diabetes mellitus without complication, without long-term current use of insulin - Primary    Relevant Orders     Comprehensive Metabolic Panel    CBC Auto Differential    Hemoglobin A1C    Diabetic Eye Screening Photo     Other Visit Diagnoses       Hyperlipidemia associated with type 2 diabetes mellitus        Relevant Medications    rosuvastatin (CRESTOR) 5 MG tablet    Other Relevant Orders    Comprehensive Metabolic Panel    CK    Lipid Panel    Chronic right shoulder pain        Relevant Medications    methylPREDNISolone (MEDROL DOSEPACK) 4 mg tablet    Other Relevant Orders    MRI Shoulder Without Contrast Right    Osteoarthritis of spine with radiculopathy, cervical region        Relevant Medications    methylPREDNISolone (MEDROL DOSEPACK) 4 mg tablet    Other Relevant Orders    MRI Cervical Spine Without Contrast         1. Controlled type 2 diabetes mellitus without complication, without long-term current use of insulin  - Comprehensive Metabolic Panel; Future  - CBC Auto Differential; Future  - Hemoglobin A1C; Future  - Diabetic Eye Screening Photo; Future  - Will treat pending results. Continue Metfomin as prescribed. Keep daily fasting CBG log. Keep appointment for annual eye exam as scheduled. Notify M.D. or ER if CBG >400 or <60, polyuria, polydipsia, or polyphagia.   Lab Results   Component Value Date    HGBA1C 7.0 06/04/2024      Continue   On ACE and Statin according to guidelines.  Follow ADA Diet. Avoid soda, simple sweets, and limit rice/pasta/breads/starches.  Maintain healthy weight with goal BMI <30.  Exercise 5 times per week for 30 minutes per day.  Stressed importance of daily foot exams.  Stressed importance of annual dilated eye exam.     2. Hyperlipidemia associated with type 2 diabetes mellitus  - Comprehensive Metabolic Panel; Future  - CK; Future  - Lipid Panel; Future  - Rx rosuvastatin (CRESTOR) 5 MG tablet; Take 1 tablet (5 mg total) by mouth nightly.  Dispense: 90 tablet; Refill: 3 refilled today; will treat pending results.   Continue  Stressed importance of dietary modifications. Follow a  low cholesterol, low saturated fat diet with less that 200mg of cholesterol a day.  Avoid fried foods and high saturated fats (high saturated fats less than 7% of calories).  Add Flax Seed/Fish Oil supplements to diet. Increase dietary fiber.  Regular exercise can reduce LDL and raise HDL. Stressed importance of physical activity 5 times per week for 30 minutes per day.      3. Chronic right shoulder pain  - MRI Shoulder Without Contrast Right; Future  - Will followup MRI results. Continue Meloxicam and Gabapentin as prescribed. Rx trial of methylPREDNISolone (MEDROL DOSEPACK) 4 mg tablet; use as directed  Dispense: 21 each; Refill: 0 to help with pain and inflammation, discussed with patient that Medrol Dosepak may temporarily increase her blood sugar reading and patient agrees to monitor her glucose readings closely and inform MD or ER if CBG is > 400. Notify M.D. or ER if symptoms persist or worsen, temp >100.4, or any acute illness.      4. Osteoarthritis of spine with radiculopathy, cervical region  - MRI Cervical Spine Without Contrast; Future  - methylPREDNISolone (MEDROL DOSEPACK) 4 mg tablet; use as directed  Dispense: 21 each; Refill: 0  - Same as #3.       Zonia was seen today for diabetes.    Diagnoses and all orders for this visit:    Controlled type 2 diabetes mellitus without complication, without long-term current use of insulin  -     Comprehensive Metabolic Panel; Future  -     CBC Auto Differential; Future  -     Hemoglobin A1C; Future  -     Diabetic Eye Screening Photo; Future    Hyperlipidemia associated with type 2 diabetes mellitus  -     Comprehensive Metabolic Panel; Future  -     CK; Future  -     Lipid Panel; Future  -     rosuvastatin (CRESTOR) 5 MG tablet; Take 1 tablet (5 mg total) by mouth nightly.    Chronic right shoulder pain  -     MRI Shoulder Without Contrast Right; Future  -     methylPREDNISolone (MEDROL DOSEPACK) 4 mg tablet; use as directed    Osteoarthritis of spine with  radiculopathy, cervical region  -     MRI Cervical Spine Without Contrast; Future  -     methylPREDNISolone (MEDROL DOSEPACK) 4 mg tablet; use as directed          Medication List with Changes/Refills   New Medications    METHYLPREDNISOLONE (MEDROL DOSEPACK) 4 MG TABLET    use as directed       Start Date: 6/4/2024  End Date: 6/25/2024   Current Medications    ASPIRIN (ECOTRIN) 81 MG EC TABLET    Take 81 mg by mouth.       Start Date: 10/4/2021 End Date: --    CETIRIZINE (ZYRTEC) 10 MG TABLET    Take 10 mg by mouth.       Start Date: 10/4/2021 End Date: --    CHOLECALCIFEROL, VITAMIN D3, (VITAMIN D3) 10 MCG (400 UNIT) TAB           Start Date: 9/9/2023  End Date: --    DICLOFENAC SODIUM (VOLTAREN) 1 % GEL    Apply 2 g topically 4 (four) times daily as needed (pain/inflammation).       Start Date: 12/4/2023 End Date: --    FLUCONAZOLE (DIFLUCAN) 100 MG TABLET    Take 100 mg by mouth. 3 tablets weekly for five weeks       Start Date: --        End Date: --    GABAPENTIN (NEURONTIN) 300 MG CAPSULE    Take 1 capsule (300 mg total) by mouth every evening.       Start Date: 5/28/2024 End Date: --    LACTOBACILLUS ACIDOPHILUS (ACIDOPHILUS ORAL)    Take by mouth Daily.       Start Date: --        End Date: --    MELOXICAM (MOBIC) 7.5 MG TABLET    Take 1 tablet (7.5 mg total) by mouth once daily.       Start Date: 5/16/2024 End Date: --    MULTIVIT WITH MIN-FOLIC ACID (ONE-A-DAY WOMEN'S 50 PLUS) 0.4 MG TAB           Start Date: 10/6/2023 End Date: --   Changed and/or Refilled Medications    Modified Medication Previous Medication    ROSUVASTATIN (CRESTOR) 5 MG TABLET rosuvastatin (CRESTOR) 5 MG tablet       Take 1 tablet (5 mg total) by mouth nightly.    Take 1 tablet (5 mg total) by mouth nightly.       Start Date: 6/4/2024  End Date: 6/4/2025    Start Date: 9/7/2023  End Date: 6/4/2024          Follow up in about 6 months (around 12/4/2024) for Wellness.

## 2024-06-04 NOTE — PATIENT INSTRUCTIONS
Cuauhtemoc Brar,     If you are due for any health screening(s) below please notify me so we can arrange them to be ordered and scheduled. Most healthy patients at your age complete them, but you are free to accept or refuse.     If you can't do it, I'll definitely understand. If you can, I'd certainly appreciate it!    Tests to Keep You Healthy    Mammogram: Met on 2/1/2024  Eye Exam: ORDERED BUT NOT SCHEDULED  Colon Cancer Screening: Met on 2/11/2024  Last HbA1c < 8 (12/04/2023): Yes      Your diabetic retinal eye exam is due     Diabetes is the #1 cause of blindness in the [place] - early detection before signs or symptoms develop can prevent debilitating blindness.     Our records indicate that you may be overdue for your annual diabetic eye exam. Eye screening can help identify patients at risk for developing vision loss which is common in diabetes. This simple screening is an important step to keeping you healthy and preventing complications from diabetes.     This recommended diabetic eye exam should take place once per year and can prevent and treat diabetes complications in the eye before developing symptoms. This can be done with a special camera is used to take photographs of the back of your eye without having to dilate them, or you can see an eye doctor for a full dilated exam.     If you recently had your yearly diabetic eye exam performed outside of [facility], please let your Health care team know so that they can update your health record.

## 2024-06-04 NOTE — TELEPHONE ENCOUNTER
----- Message from Zara Fairbanks MD sent at 6/4/2024 12:27 PM CDT -----  Cholesterol is well controlled, LDL: 58, normal <100, continue current treatment plan, recheck CMP, FLP, CPK in 12/2024. Diabetes is controlled, HgA1C: 7.0%, was 6.6% previously, goal is <7.0%, continue current diabetes treatment plan, recheck CMP, HgA1C in 09/2024. Remaining labs are stable from previous.

## 2024-06-05 ENCOUNTER — PATIENT MESSAGE (OUTPATIENT)
Dept: FAMILY MEDICINE | Facility: CLINIC | Age: 54
End: 2024-06-05
Payer: COMMERCIAL

## 2024-06-05 ENCOUNTER — TELEPHONE (OUTPATIENT)
Dept: FAMILY MEDICINE | Facility: CLINIC | Age: 54
End: 2024-06-05
Payer: COMMERCIAL

## 2024-06-05 NOTE — TELEPHONE ENCOUNTER
----- Message from Zara Fairbanks MD sent at 6/4/2024  5:32 PM CDT -----  Blood appears to be a little thick, was normal 2 months ago, not sure if this is due to her Tremfya or some other cause. I placed orders for additional labs for further evaluation, which includes blood work and urine testing, patient can have for at her earliest convenience.

## 2024-06-05 NOTE — PROGRESS NOTES
Zonia Washington is a 53 y.o. female here for a diabetic eye screening with non-dilated fundus photos per Dr Fairbanks.    Patient cooperative?: Yes  Small pupils?: No  Last eye exam: NA    For exam results, see Encounter Report.

## 2024-06-07 ENCOUNTER — LAB VISIT (OUTPATIENT)
Dept: LAB | Facility: HOSPITAL | Age: 54
End: 2024-06-07
Attending: FAMILY MEDICINE
Payer: COMMERCIAL

## 2024-06-07 DIAGNOSIS — D75.1 POLYCYTHEMIA: ICD-10-CM

## 2024-06-07 LAB
BACTERIA #/AREA URNS AUTO: ABNORMAL /HPF
BILIRUB UR QL STRIP.AUTO: NEGATIVE
CLARITY UR: CLEAR
COLOR UR AUTO: ABNORMAL
GLUCOSE UR QL STRIP: NORMAL
HGB UR QL STRIP: NEGATIVE
KETONES UR QL STRIP: NEGATIVE
LEUKOCYTE ESTERASE UR QL STRIP: 500
MUCOUS THREADS URNS QL MICRO: ABNORMAL /LPF
NITRITE UR QL STRIP: NEGATIVE
PH UR STRIP: 5.5 [PH]
PROT UR QL STRIP: NEGATIVE
RBC #/AREA URNS AUTO: ABNORMAL /HPF
SP GR UR STRIP.AUTO: 1.01 (ref 1–1.03)
SQUAMOUS #/AREA URNS LPF: ABNORMAL /HPF
UROBILINOGEN UR STRIP-ACNC: NORMAL
WBC #/AREA URNS AUTO: ABNORMAL /HPF

## 2024-06-07 PROCEDURE — 82668 ASSAY OF ERYTHROPOIETIN: CPT

## 2024-06-07 PROCEDURE — 81001 URINALYSIS AUTO W/SCOPE: CPT

## 2024-06-07 PROCEDURE — 87077 CULTURE AEROBIC IDENTIFY: CPT

## 2024-06-07 PROCEDURE — 36415 COLL VENOUS BLD VENIPUNCTURE: CPT

## 2024-06-08 LAB — EPO SERPL-ACNC: 21.6 MIU/ML (ref 2.6–18.5)

## 2024-06-09 LAB — BACTERIA UR CULT: ABNORMAL

## 2024-06-11 ENCOUNTER — TELEPHONE (OUTPATIENT)
Dept: FAMILY MEDICINE | Facility: CLINIC | Age: 54
End: 2024-06-11
Payer: COMMERCIAL

## 2024-06-11 ENCOUNTER — HOSPITAL ENCOUNTER (OUTPATIENT)
Dept: RADIOLOGY | Facility: HOSPITAL | Age: 54
Discharge: HOME | End: 2024-06-11
Attending: FAMILY MEDICINE
Payer: COMMERCIAL

## 2024-06-11 DIAGNOSIS — M47.22 OSTEOARTHRITIS OF SPINE WITH RADICULOPATHY, CERVICAL REGION: Primary | ICD-10-CM

## 2024-06-11 DIAGNOSIS — M25.511 CHRONIC RIGHT SHOULDER PAIN: ICD-10-CM

## 2024-06-11 DIAGNOSIS — A49.9 BACTERIAL UTI: ICD-10-CM

## 2024-06-11 DIAGNOSIS — G47.19 EXCESSIVE DAYTIME SLEEPINESS: ICD-10-CM

## 2024-06-11 DIAGNOSIS — M47.22 OSTEOARTHRITIS OF SPINE WITH RADICULOPATHY, CERVICAL REGION: ICD-10-CM

## 2024-06-11 DIAGNOSIS — R06.83 SNORING: ICD-10-CM

## 2024-06-11 DIAGNOSIS — G89.29 CHRONIC RIGHT SHOULDER PAIN: ICD-10-CM

## 2024-06-11 DIAGNOSIS — D75.1 ERYTHROPOIETIN POLYCYTHEMIA: ICD-10-CM

## 2024-06-11 DIAGNOSIS — N39.0 BACTERIAL UTI: ICD-10-CM

## 2024-06-11 PROCEDURE — 72141 MRI NECK SPINE W/O DYE: CPT | Mod: TC

## 2024-06-11 PROCEDURE — 73221 MRI JOINT UPR EXTREM W/O DYE: CPT | Mod: TC,RT

## 2024-06-11 RX ORDER — CIPROFLOXACIN 500 MG/1
500 TABLET ORAL EVERY 12 HOURS
Qty: 14 TABLET | Refills: 0 | Status: SHIPPED | OUTPATIENT
Start: 2024-06-11 | End: 2024-06-18

## 2024-06-11 NOTE — TELEPHONE ENCOUNTER
----- Message from Brianna Heredia LPN sent at 6/11/2024  4:00 PM CDT -----  Spoke with patient about her results.  She states that she was told at one time she did snore at night.  She also states that she does not sleep well. Did you want to order a sleep study?  ----- Message -----  From: Domingo Padgett  Sent: 6/11/2024   3:08 PM CDT  To: Magdi MAZARIEGOS Staff    .Type:  Needs Medical Advice    Who Called: Zonia  Symptoms (please be specific):    How long has patient had these symptoms:    Pharmacy name and phone #:    Would the patient rather a call back or a response via [facility]?   Best Call Back Number: [phone]  Additional Information: Patient was returning a call back from the office she missed.

## 2024-06-11 NOTE — TELEPHONE ENCOUNTER
----- Message from Zara Fairbanks MD sent at 6/11/2024  1:12 PM CDT -----  Final urine culture is suggestive of bacterial urinary tract infection, I sent a prescription for an antibiotic called Cipro to take as directed until the course is completed, will need to repeat urinalysis in 2 weeks to ensure resolution of infection. Erythropoietin (EPO) level is elevated. Erythropoietin (zz-yopd-gjj-POY-uh-tin) is a hormone that your kidneys primarily produce. Erythropoietin (EPO) helps your body maintain a healthy amount of red blood cells (erythrocytes). Inappropriately high levels of erythropoietin can cause high levels of red blood cells. Another name for high levels of red blood cells is polycythemia.   What causes high levels of EPO?   - Long-term (chronic) exposure to low levels of oxygen can increase your EPO levels. This may be due to chronic smoking or living in a high-altitude environment where air oxygen levels are lower. Elevated EPO from a high-altitude environment is normal and appropriately high.  - Anemia may not result from kidney disease and still cause high EPO levels. Anemia happens when you don't have enough red blood cells or your red blood cells don't work as they should. It can cause high levels of EPO because your kidneys sense that you don't have enough red blood cells so they release extra EPO. This is a normal and appropriately high level of EPO.  - Sleep apnea can cause elevated EPO levels, please advise if any snoring or non-restorative sleep, so that sleep study can be ordered.   - In rare cases, certain tumors can also cause your kidneys to release inappropriately excessive EPO. I ordered a CT abdomen/pelvis for evaluation of her kidneys and abdominal organs.     Remaining labs are essentially normal.

## 2024-06-12 ENCOUNTER — TELEPHONE (OUTPATIENT)
Dept: FAMILY MEDICINE | Facility: CLINIC | Age: 54
End: 2024-06-12
Payer: COMMERCIAL

## 2024-06-12 NOTE — TELEPHONE ENCOUNTER
----- Message from Zara Fairbanks MD sent at 6/11/2024  5:01 PM CDT -----  MRI C-spine (neck) shows   Multilevel cervical spondylosis. Cervical spondylosis is a general term for age-related wear and tear affecting the spinal disks in your neck. As the disks dehydrate and shrink, signs of osteoarthritis develop, including bony projections along the edges of bones (bone spurs). Most people experience no symptoms. When symptoms do occur, they typically include pain and stiffness in the neck.  Sometimes, cervical spondylosis results in a narrowing of the spinal canal within the bones of the spine (the vertebrae). The spinal canal is the space inside the vertebrae that the spinal cord and the nerve roots pass through to reach the rest of the body. If the spinal cord or nerve roots become pinched, you might experience:  - Tingling, numbness and weakness in the arms, hands, legs or feet  - Lack of coordination and difficulty walking  - Loss of bladder or bowel control    - Since she has completed physical therapy, I placed a referral to pain management for evaluation and treatment, which may or may not include injections.

## 2024-06-13 ENCOUNTER — TELEPHONE (OUTPATIENT)
Dept: FAMILY MEDICINE | Facility: CLINIC | Age: 54
End: 2024-06-13
Payer: COMMERCIAL

## 2024-06-13 NOTE — TELEPHONE ENCOUNTER
----- Message from Zara Fairbanks MD sent at 6/12/2024  6:30 PM CDT -----  MRI right shoulder shows a high-grade partial-thickness rim rent type tear is present to the distal supraspinatus tendon and foot plate, which is a rotator cuff muscle. The remainder of the rotator cuff is intact. Degeneration (arthritis) changed in her labrum. Remaining MRI right shoulder is normal. Please forward results to her Ortho (Dr. Keen) for review/treatment recommendations.

## 2024-06-18 ENCOUNTER — OFFICE VISIT (OUTPATIENT)
Dept: ORTHOPEDICS | Facility: CLINIC | Age: 54
End: 2024-06-18
Payer: COMMERCIAL

## 2024-06-18 VITALS
DIASTOLIC BLOOD PRESSURE: 74 MMHG | SYSTOLIC BLOOD PRESSURE: 117 MMHG | HEIGHT: 61 IN | WEIGHT: 156.06 LBS | HEART RATE: 71 BPM | BODY MASS INDEX: 29.47 KG/M2

## 2024-06-18 DIAGNOSIS — M54.12 CERVICAL RADICULOPATHY: Primary | ICD-10-CM

## 2024-06-18 DIAGNOSIS — M75.101 ROTATOR CUFF SYNDROME OF RIGHT SHOULDER: ICD-10-CM

## 2024-06-18 PROCEDURE — 3078F DIAST BP <80 MM HG: CPT | Mod: CPTII,,, | Performed by: ORTHOPAEDIC SURGERY

## 2024-06-18 PROCEDURE — 3008F BODY MASS INDEX DOCD: CPT | Mod: CPTII,,, | Performed by: ORTHOPAEDIC SURGERY

## 2024-06-18 PROCEDURE — 3074F SYST BP LT 130 MM HG: CPT | Mod: CPTII,,, | Performed by: ORTHOPAEDIC SURGERY

## 2024-06-18 PROCEDURE — 99214 OFFICE O/P EST MOD 30 MIN: CPT | Mod: ,,, | Performed by: ORTHOPAEDIC SURGERY

## 2024-06-18 PROCEDURE — 1159F MED LIST DOCD IN RCRD: CPT | Mod: CPTII,,, | Performed by: ORTHOPAEDIC SURGERY

## 2024-06-18 PROCEDURE — 3051F HG A1C>EQUAL 7.0%<8.0%: CPT | Mod: CPTII,,, | Performed by: ORTHOPAEDIC SURGERY

## 2024-06-18 RX ORDER — PREDNISONE 10 MG/1
TABLET ORAL
COMMUNITY
Start: 2024-06-06

## 2024-06-18 RX ORDER — ACITRETIN 10 MG/1
CAPSULE ORAL
COMMUNITY
Start: 2024-06-07

## 2024-06-18 NOTE — PROGRESS NOTES
[facility]  Orthopedic Clinic Note      Chief Complaint:   Chief Complaint   Patient presents with    Results     Patient here today for f/u imaging results. Patient notified of CT results via portal and followed up with PCP, who then ordered an MRI right shoulder and MRI cervical spine. Dr. Fairbanks advised pt to follow up with us for those results. She states her shoulder pain is not as bad as it was and is a 1/10 but says she is taking medication rx (prednisone) prescribed by derm for psoriasis which she thinks is helping the shoulder pain.      Referring Physician: No ref. provider found      History of Present Illness:    This is a 53 y.o. year old female presenting with complaints of right shoulder pain acutely worsening since 05/14/2024.  She denies any injury or trauma to the shoulder.  States that she did have the same symptoms a few months ago that resolved with physical therapy.  Pain is most notable over the right chest wall and extends into the right breast and axilla and over the posterior aspect of the shoulder.  It is causing difficulty with sleeping.  She denies any issues with range of motion of the shoulder.  She has attempted prescribed meloxicam and physical therapy with continued symptoms.  She was evaluated in the ED for the same complaint on 05/16/2024 where cardiac etiology was ruled out.  X-rays of both the chest and right shoulder did not demonstrate any acute pathology.  She was placed in a sling and instructed to follow up as an outpatient with Orthopedics.  06/18/2024 this patient comes in today stating that the chest wall symptoms that she had when I initially saw her have resolved.  The CT scan of her chest was demonstrated some chronic changes in her lungs.  She is still having some mild shoulder pain and her PCP ordered a MRI of her cervical spine and her shoulder.  The shoulder MRI on demonstrates a small rent tear in his shoulder that I am not too concerned about.  The  cervical MRI does show some spondylosis.      Past Medical History:   Diagnosis Date    Mixed hyperlipidemia 10/31/2023    Obesity, unspecified     Psoriasis 5/29/2022    Type 2 diabetes mellitus without complications        Past Surgical History:   Procedure Laterality Date    CHOLECYSTECTOMY      DILATION AND CURETTAGE OF UTERUS      HERNIA REPAIR      HERNIA REPAIR      HYSTERECTOMY         Current Outpatient Medications   Medication Sig    acitretin (SORIATANE) 10 MG capsule Take by mouth.    aspirin (ECOTRIN) 81 MG EC tablet Take 81 mg by mouth.    cetirizine (ZYRTEC) 10 MG tablet Take 10 mg by mouth.    cholecalciferol, vitamin D3, (VITAMIN D3) 10 mcg (400 unit) Tab     ciprofloxacin HCl (CIPRO) 500 MG tablet Take 1 tablet (500 mg total) by mouth every 12 (twelve) hours. for 7 days    diclofenac sodium (VOLTAREN) 1 % Gel Apply 2 g topically 4 (four) times daily as needed (pain/inflammation).    fluconazole (DIFLUCAN) 100 MG tablet Take 100 mg by mouth. 3 tablets weekly for five weeks    gabapentin (NEURONTIN) 300 MG capsule Take 1 capsule (300 mg total) by mouth every evening.    Lactobacillus acidophilus (ACIDOPHILUS ORAL) Take by mouth Daily.    meloxicam (MOBIC) 7.5 MG tablet Take 1 tablet (7.5 mg total) by mouth once daily.    multivit with min-folic acid (ONE-A-DAY WOMEN'S 50 PLUS) 0.4 mg Tab     predniSONE (DELTASONE) 10 MG tablet Take by mouth.    rosuvastatin (CRESTOR) 5 MG tablet Take 1 tablet (5 mg total) by mouth nightly.     No current facility-administered medications for this visit.       Review of patient's allergies indicates:   Allergen Reactions    Latex Hives, Itching and Rash       Family History   Problem Relation Name Age of Onset    Diabetes Mother Niecee Felix     Cancer Mother Niecee Felix     Cancer Father Carlos Espinoza        Social History     Socioeconomic History    Marital status:    Tobacco Use    Smoking status: Never    Smokeless tobacco: Never   Substance and Sexual  "Activity    Alcohol use: Yes     Comment: Occasionally    Drug use: Never    Sexual activity: Not Currently     Partners: Male     Birth control/protection: None   Social History Narrative    ** Merged History Encounter **          Social Determinants of Health     Financial Resource Strain: Low Risk  (11/29/2023)    Overall Financial Resource Strain (CARDIA)     Difficulty of Paying Living Expenses: Not hard at all   Food Insecurity: No Food Insecurity (11/29/2023)    Hunger Vital Sign     Worried About Running Out of Food in the Last Year: Never true     Ran Out of Food in the Last Year: Never true   Transportation Needs: No Transportation Needs (11/29/2023)    PRAPARE - Transportation     Lack of Transportation (Medical): No     Lack of Transportation (Non-Medical): No   Physical Activity: Insufficiently Active (11/29/2023)    Exercise Vital Sign     Days of Exercise per Week: 5 days     Minutes of Exercise per Session: 10 min   Stress: No Stress Concern Present (11/29/2023)    [employer] - Occupational Stress Questionnaire     Feeling of Stress : Not at all   Housing Stability: Low Risk  (11/29/2023)    Housing Stability Vital Sign     Unable to Pay for Housing in the Last Year: No     Number of Places Lived in the Last Year: 1     Unstable Housing in the Last Year: No           Review of Systems:  All review of systems negative except for those stated in the HPI.    Examination:    Vital Signs:    Vitals:    06/18/24 0851   BP: 117/74   Pulse: 71   Weight: 70.8 kg (156 lb 1.4 oz)   Height: 5' 1" (1.549 m)       Body mass index is 29.49 kg/m².    Physical Examination:  General: Well-developed, well-nourished.  Neuro: Alert and oriented x 3.  Psych: Normal mood and affect.  Card: Regular rate and rhythm  Resp: Respirations regular and unlabored  Right Shoulder Exam:  No obvious deformity. No medial or lateral scapula winging. Forward flexion to 170 degrees and abduction to 170 " degrees and external rotation 80 degrees is and internal rotation is 80 degrees. Negative empty can, Whipple, Drop Arm Test, Thompson, impingement, AC joint tenderness. Negative biceps groove tenderness, Heck´s, Yergason´s, Speed test. Negative Apprehension and Relocation test. 5/5 strength, normal skin appearance and palpable pulses distally. Sensibility normal.      Imaging:  Prior four views of the right shoulder demonstrate questionable AC joint injury.    Assessment: Rotator cuff syndrome of right shoulder          Plan:  I believe most of her issues are secondary to cervical spondylosis.  She is already scheduled to see a spine specialist.  She will come back to see me if her symptoms do not improve by treatment from the spine specialist.  She verbalized understanding of the plan of care with no further questions.    Thony Keen MD personally performed the services described in this documentation, including but not limited to patient's history, physical examination, and assessment and plan of care. All medical record entries made by DHAVAL Veras were performed at his direction and in his presence. The medical record was reviewed and is accurate and complete.         No follow-ups on file.      DISCLAIMER: This note may have been dictated using voice recognition software and may contain grammatical errors.     NOTE: Consult report sent to referring provider via [employer] EMR.

## 2024-06-21 ENCOUNTER — TELEPHONE (OUTPATIENT)
Dept: FAMILY MEDICINE | Facility: CLINIC | Age: 54
End: 2024-06-21
Payer: COMMERCIAL

## 2024-06-21 NOTE — TELEPHONE ENCOUNTER
----- Message from Marta Oreilly sent at 6/21/2024 10:32 AM CDT -----  Regarding: home sleep study  .Type:  Needs Medical Advice    Who Called: home sleep delivered  Symptoms (please be specific):    How long has patient had these symptoms:    Pharmacy name and phone #:    Would the patient rather a call back or a response via [facility]?   Best Call Back Number: [phone]  Additional Information: Signature on ordered - state that your ordering sleep study  Also office visit notes

## 2024-06-25 ENCOUNTER — OFFICE VISIT (OUTPATIENT)
Dept: PAIN MEDICINE | Facility: CLINIC | Age: 54
End: 2024-06-25
Payer: COMMERCIAL

## 2024-06-25 VITALS
DIASTOLIC BLOOD PRESSURE: 82 MMHG | WEIGHT: 156 LBS | HEART RATE: 80 BPM | BODY MASS INDEX: 29.45 KG/M2 | HEIGHT: 61 IN | SYSTOLIC BLOOD PRESSURE: 125 MMHG

## 2024-06-25 DIAGNOSIS — M47.812 CERVICAL SPONDYLOSIS: Primary | ICD-10-CM

## 2024-06-25 DIAGNOSIS — M47.22 OSTEOARTHRITIS OF SPINE WITH RADICULOPATHY, CERVICAL REGION: ICD-10-CM

## 2024-06-25 DIAGNOSIS — M48.02 FORAMINAL STENOSIS OF CERVICAL REGION: ICD-10-CM

## 2024-06-25 PROCEDURE — 3008F BODY MASS INDEX DOCD: CPT | Mod: CPTII,,, | Performed by: NURSE PRACTITIONER

## 2024-06-25 PROCEDURE — 3079F DIAST BP 80-89 MM HG: CPT | Mod: CPTII,,, | Performed by: NURSE PRACTITIONER

## 2024-06-25 PROCEDURE — 3051F HG A1C>EQUAL 7.0%<8.0%: CPT | Mod: CPTII,,, | Performed by: NURSE PRACTITIONER

## 2024-06-25 PROCEDURE — 99205 OFFICE O/P NEW HI 60 MIN: CPT | Mod: ,,, | Performed by: NURSE PRACTITIONER

## 2024-06-25 PROCEDURE — 1159F MED LIST DOCD IN RCRD: CPT | Mod: CPTII,,, | Performed by: NURSE PRACTITIONER

## 2024-06-25 PROCEDURE — 1160F RVW MEDS BY RX/DR IN RCRD: CPT | Mod: CPTII,,, | Performed by: NURSE PRACTITIONER

## 2024-06-25 PROCEDURE — 3074F SYST BP LT 130 MM HG: CPT | Mod: CPTII,,, | Performed by: NURSE PRACTITIONER

## 2024-06-25 NOTE — PROGRESS NOTES
[facility]    Subjective:     Chief Complaint: Osteoarthritis (OA spine w/radiculopathy cervical, starts from on top of Rt breast radiates around to shoulder blade, no prior injury or sx, pain started May 14,2024 referral from Dr Fairbanks, not taking pain meds, has recent imaging in chart. C/O pain level 5)    Referred by: Zara Fairbanks, *     History of Present Illness: Zonia Washington is a 53 y.o. female presents today for a new referral for osteoarthritis in the spine with radiculopathy and pain cervical region of spine.  Patient had right arm pain starting May 14th that occurred spontaneously.  She was evaluated by her primary physician who sent her to ortho to see Dr. Thony guy even though there was some change in her right rotator cuff he ruled out the majority of her right arm pain was not coming from her rotator cuff instead it was originating from her neck.  Review of her cervical MRI shows multi-level disc osteophyte complexes in the summary it states multilevel of spondylitic changes.  Patient completed 12 weeks of physical therapy that reduced her right arm and hand pain by about 50% however she is has ongoing pain to the back of her neck as well.  She also completes home exercises and reports having some benefit using the 10s unit during physical therapy.  She has no history of spinal procedures, pacemaker or defibrillator.  She does have pertinent past medical history of psoriasis with infusion of Tremfya.  Additionally she takes aspirin 81 mg.  Pain will elevate when driving and causes some insomnia.  Once she finds a comfortable position she stays there as her pain will elevate to a 8/10.  She denies any saddle anesthesia, fecal incontinence and urinary retention.  Her pain will reduce slightly when she stops the activity and uses heat she will also takes some Tylenol as needed.    Vital signs:   Visit Vitals  /82 (BP Location: Right arm, Patient Position: Sitting, BP  "Method: Medium (Automatic))   Pulse 80   Ht 5' 1" (1.549 m)   Wt 70.8 kg (156 lb)   BMI 29.48 kg/m²      Vitals:    06/25/24 1006   PainSc:   5             Interventional Pain History  None    ROS: Neck, back and leg pain    Cervical MRI 2024:   MRI CERVICAL SPINE WITHOUT CONTRAST     CLINICAL HISTORY:  Neck pain, chronic, degenerative changes on xray;  Other spondylosis with radiculopathy, cervical region     TECHNIQUE:  Multiplanar, multisequence MR images of the cervical spine were acquired without the administration of contrast.     COMPARISON:  Cervical spine radiographs 12/04/2023.     FINDINGS:  CORD: Normal size and signal.  No syrinx.  Cervicomedullary junction is normal.     ALIGNMENT: Normal.  Lateral masses of C1 and C2 are congruent.     BONES: Vertebral body heights are maintained.  No aggressive bone marrow signal.     PARASPINAL AREA: Normal.     CERVICAL DISC LEVELS:     C2-C3: Unremarkable.     C3-C4: Broad-based disc osteophyte more pronounced on the right causing mild central canal stenosis and moderate right foraminal stenosis.  The left foramina is patent.     C4-C5: Broad-based disc osteophyte causing mild central canal stenosis and mild bilateral foraminal stenosis.     C5-C6: Unremarkable.     C6-C7: Shallow broad based disc osteophyte causing mild central canal stenosis.  No significant foraminal stenosis.     C7-T1: Unremarkable.     Impression:     Multilevel cervical spondylosis as detailed on a level by level basis.        Electronically signed by:Armond Perales  Date:                                            06/11/2024  Time:                                           15:48    Lumbar spine :   None obtained    Objective:        Physical Exam  General: Well developed; normal weight; A&O x 3; No anxiety/depression; NAD  Mental Status: Oriented to person, palce and time. Displays appropriate mood & affect.  Head: Norm cephalic and atraumatic  Neck:  No cervical paraspinal banding.  Full " range of motion with lateral turning and cervical flexion +extension.equal hand  bilaterally  Eyes: normal conjunctiva, normal lids, normal pupils  ENT and mouth: normal external ear, nose, and no lesions noted on the lips.  Respiratory: Symmetrical, Unlabored. No dyspnea  CV: normal rhythm and rate. No peripheral edema.   Abdomen: Non-distended    Extremities:  Gen: No cyanosis or tenderness to palpation bilateral upper and lower extremities  Skin: Warm, pink, dry, no rashes, no lesions on the lumbar spine  Strength: 5/5 motor strength bilateral upper and lower extremities  ROM: Full ROM in bilateral knees and ankles without pain or instability.    Neuro:  Gait: no altalgic lean, normal toe and heel raise. Independent ambulator.  DTR's: 2+ in bilateral patella  Sensory: Intact to light touch bilateral  upper and lower extremities    Spine: Normal lordosis. mild scoliosis  L-spine ROM: Full ROM to flexion, extension, bilateral rotation,   Straight Leg Raise: neg bilaterally  SI Joint: No tenderness to palpation bilaterally.      Assessment:     Zonia Washington is a 53 y.o. female presents today for a new referral for osteoarthritis in the spine with radiculopathy and pain cervical region of spine.  Patient had right arm pain starting May 14th that occurred spontaneously.  She was evaluated by her primary physician who sent her to ortho to see Dr. Thony guy even though there was some change in her right rotator cuff he ruled out the majority of her right arm pain was not coming from her rotator cuff instead it was originating from her neck.  Review of her cervical MRI shows multi-level disc osteophyte complexes in the summary it states multilevel of spondylitic changes.  Patient completed 12 weeks of physical therapy that reduced her right arm and hand pain by about 50% however she is has ongoing pain to the back of her neck as well.  She also completes home exercises and reports having some benefit using the  10s unit during physical therapy.  She has no history of spinal procedures, pacemaker or defibrillator.  She does have pertinent past medical history of psoriasis with infusion of Tremfya.  Additionally she takes aspirin 81 mg.  Pain will elevate when driving and causes some insomnia.  Once she finds a comfortable position she stays there as her pain will elevate to a 8/10.  She denies any saddle anesthesia, fecal incontinence and urinary retention.  Her pain will reduce slightly when she stops the activity and uses heat she will also takes some Tylenol as needed.     Plan of Care (discussed with Dr. Esquivel)  CA C7-T1  Request radiologist review to confirm levels in MRI C spine with facet arthropathy/spondylosis. Current  MRI C Spine imaging notes summary multi levels of cervical spondylosis;however, each cervical level notes cervical disc osteophysts.Once this is addended, I will review for spondylosis.   Hold  all blood thiners  Receives Tremfya  every 8 weeks. Hold one week prior to injrection.     Encounter Diagnoses   Name Primary?    Osteoarthritis of spine with radiculopathy, cervical region     Cervical spondylosis Yes    Foraminal stenosis of cervical region          Plan:       Zonia was seen today for osteoarthritis.    Diagnoses and all orders for this visit:    Cervical spondylosis    Osteoarthritis of spine with radiculopathy, cervical region  -     Ambulatory referral/consult to Pain Clinic    Foraminal stenosis of cervical region               Past Medical History:   Diagnosis Date    Mixed hyperlipidemia 10/31/2023    Obesity, unspecified     Psoriasis 5/29/2022    Type 2 diabetes mellitus without complications        Past Surgical History:   Procedure Laterality Date    CHOLECYSTECTOMY      DILATION AND CURETTAGE OF UTERUS      HERNIA REPAIR      HERNIA REPAIR      HYSTERECTOMY         Family History   Problem Relation Name Age of Onset    Diabetes Mother Niecee Felix     Cancer Mother Niecee  Felix     Cancer Father Carlos Murphy        Social History     Socioeconomic History    Marital status:    Tobacco Use    Smoking status: Never    Smokeless tobacco: Never   Substance and Sexual Activity    Alcohol use: Yes     Comment: Occasionally    Drug use: Never    Sexual activity: Not Currently     Partners: Male     Birth control/protection: None   Social History Narrative    ** Merged History Encounter **          Social Determinants of Health     Financial Resource Strain: Low Risk  (11/29/2023)    Overall Financial Resource Strain (CARDIA)     Difficulty of Paying Living Expenses: Not hard at all   Food Insecurity: No Food Insecurity (11/29/2023)    Hunger Vital Sign     Worried About Running Out of Food in the Last Year: Never true     Ran Out of Food in the Last Year: Never true   Transportation Needs: No Transportation Needs (11/29/2023)    PRAPARE - Transportation     Lack of Transportation (Medical): No     Lack of Transportation (Non-Medical): No   Physical Activity: Insufficiently Active (11/29/2023)    Exercise Vital Sign     Days of Exercise per Week: 5 days     Minutes of Exercise per Session: 10 min   Stress: No Stress Concern Present (11/29/2023)    [employer] - Occupational Stress Questionnaire     Feeling of Stress : Not at all   Housing Stability: Low Risk  (11/29/2023)    Housing Stability Vital Sign     Unable to Pay for Housing in the Last Year: No     Number of Places Lived in the Last Year: 1     Unstable Housing in the Last Year: No       Current Outpatient Medications   Medication Sig Dispense Refill    acitretin (SORIATANE) 10 MG capsule Take by mouth.      aspirin (ECOTRIN) 81 MG EC tablet Take 81 mg by mouth.      cetirizine (ZYRTEC) 10 MG tablet Take 10 mg by mouth.      cholecalciferol, vitamin D3, (VITAMIN D3) 10 mcg (400 unit) Tab       diclofenac sodium (VOLTAREN) 1 % Gel Apply 2 g topically 4 (four) times daily as needed  (pain/inflammation). 100 g 1    fluconazole (DIFLUCAN) 100 MG tablet Take 100 mg by mouth. 3 tablets weekly for five weeks      gabapentin (NEURONTIN) 300 MG capsule Take 1 capsule (300 mg total) by mouth every evening. 20 capsule 0    Lactobacillus acidophilus (ACIDOPHILUS ORAL) Take by mouth Daily.      meloxicam (MOBIC) 7.5 MG tablet Take 1 tablet (7.5 mg total) by mouth once daily. 20 tablet 0    multivit with min-folic acid (ONE-A-DAY WOMEN'S 50 PLUS) 0.4 mg Tab       predniSONE (DELTASONE) 10 MG tablet Take by mouth.      rosuvastatin (CRESTOR) 5 MG tablet Take 1 tablet (5 mg total) by mouth nightly. 90 tablet 3     No current facility-administered medications for this visit.       Review of patient's allergies indicates:   Allergen Reactions    Latex Hives, Itching and Rash

## 2024-06-27 ENCOUNTER — TELEPHONE (OUTPATIENT)
Dept: FAMILY MEDICINE | Facility: CLINIC | Age: 54
End: 2024-06-27
Payer: COMMERCIAL

## 2024-06-27 NOTE — TELEPHONE ENCOUNTER
Received fax from [facility] stating to please addendum the last office note stating or discussing pts sleep issues. Issues to discuss in office note     - Loud Snoring  - Gasping for air  - Restless Sleep  - Witness Apneic Events  - Excessive Daytime Fatigue    Please advise, Sleep referral was ordered 06/11/2024  Thanks

## 2024-06-28 ENCOUNTER — HOSPITAL ENCOUNTER (OUTPATIENT)
Dept: RADIOLOGY | Facility: HOSPITAL | Age: 54
Discharge: HOME | End: 2024-06-28
Attending: FAMILY MEDICINE
Payer: COMMERCIAL

## 2024-06-28 DIAGNOSIS — N75.0 BARTHOLIN'S GLAND CYST: Primary | ICD-10-CM

## 2024-06-28 DIAGNOSIS — D75.1 ERYTHROPOIETIN POLYCYTHEMIA: ICD-10-CM

## 2024-06-28 PROCEDURE — 74178 CT ABD&PLV WO CNTR FLWD CNTR: CPT | Mod: TC

## 2024-06-28 PROCEDURE — 25500020 PHARM REV CODE 255: Performed by: FAMILY MEDICINE

## 2024-06-28 RX ADMIN — IOHEXOL 99 ML: 350 INJECTION, SOLUTION INTRAVENOUS at 08:06

## 2024-06-28 RX ADMIN — DIATRIZOATE MEGLUMINE AND DIATRIZOATE SODIUM 30 ML: 600; 100 SOLUTION ORAL; RECTAL at 08:06

## 2024-07-01 ENCOUNTER — TELEPHONE (OUTPATIENT)
Dept: FAMILY MEDICINE | Facility: CLINIC | Age: 54
End: 2024-07-01
Payer: COMMERCIAL

## 2024-07-01 NOTE — TELEPHONE ENCOUNTER
----- Message from Kirstie Figueroa sent at 7/1/2024  9:27 AM CDT -----  .Who Called: Zonia Washington    Patient is returning phone call    Who Left Message for Patient: Cony  Does the patient know what this is regarding?:Labs      Preferred Method of Contact: Phone Call  Patient's Preferred Phone Number on File: [phone]   Best Call Back Number, if different:  Additional Information: Pt.states returning phone call. Nothing listed in chart.

## 2024-07-01 NOTE — TELEPHONE ENCOUNTER
Patient notified of results and recommendation. Patient denies any discomfort or irritation to the area.  Will contact her Ob-gyn if she develops any discomfort.

## 2024-07-01 NOTE — TELEPHONE ENCOUNTER
----- Message from Zara Fairbanks MD sent at 6/28/2024 12:46 PM CDT -----  CT abdomen/pelvis shows a cyst on her right labia and her left labia called a Bartholin gland cyst. A Bartholin cyst is usually harmless, although it can become painful and irritating.  In most cases, the cyst heals on its own or with a combination of at-home care, antibiotics, and drainage by a GYN. Please advise if patient has any pain or symptoms for these cysts. Remaining CT abdomen/pelvis is normal.

## 2024-07-05 ENCOUNTER — APPOINTMENT (OUTPATIENT)
Dept: LAB | Facility: HOSPITAL | Age: 54
End: 2024-07-05
Attending: STUDENT IN AN ORGANIZED HEALTH CARE EDUCATION/TRAINING PROGRAM
Payer: COMMERCIAL

## 2024-07-05 DIAGNOSIS — L50.9 URTICARIA, UNSPECIFIED: Primary | ICD-10-CM

## 2024-07-05 LAB
ALBUMIN SERPL-MCNC: 3.8 G/DL (ref 3.5–5)
ALBUMIN/GLOB SERPL: 1 RATIO (ref 1.1–2)
ALP SERPL-CCNC: 113 UNIT/L (ref 40–150)
ALT SERPL-CCNC: 21 UNIT/L (ref 0–55)
ANION GAP SERPL CALC-SCNC: 10 MEQ/L
AST SERPL-CCNC: 12 UNIT/L (ref 5–34)
BILIRUB SERPL-MCNC: 0.4 MG/DL
BUN SERPL-MCNC: 12.9 MG/DL (ref 9.8–20.1)
CALCIUM SERPL-MCNC: 9.7 MG/DL (ref 8.4–10.2)
CHLORIDE SERPL-SCNC: 105 MMOL/L (ref 98–107)
CHOLEST SERPL-MCNC: 159 MG/DL
CHOLEST/HDLC SERPL: 3 {RATIO} (ref 0–5)
CO2 SERPL-SCNC: 28 MMOL/L (ref 22–29)
CREAT SERPL-MCNC: 0.88 MG/DL (ref 0.55–1.02)
CREAT/UREA NIT SERPL: 15
GFR SERPLBLD CREATININE-BSD FMLA CKD-EPI: >60 ML/MIN/1.73/M2
GLOBULIN SER-MCNC: 3.9 GM/DL (ref 2.4–3.5)
GLUCOSE SERPL-MCNC: 124 MG/DL (ref 74–100)
HDLC SERPL-MCNC: 55 MG/DL (ref 35–60)
LDLC SERPL CALC-MCNC: 85 MG/DL (ref 50–140)
POTASSIUM SERPL-SCNC: 4.1 MMOL/L (ref 3.5–5.1)
PROT SERPL-MCNC: 7.7 GM/DL (ref 6.4–8.3)
SODIUM SERPL-SCNC: 143 MMOL/L (ref 136–145)
TRIGL SERPL-MCNC: 97 MG/DL (ref 37–140)
VLDLC SERPL CALC-MCNC: 19 MG/DL

## 2024-07-05 PROCEDURE — 80061 LIPID PANEL: CPT

## 2024-07-05 PROCEDURE — 80053 COMPREHEN METABOLIC PANEL: CPT

## 2024-07-05 PROCEDURE — 36415 COLL VENOUS BLD VENIPUNCTURE: CPT

## 2024-07-24 ENCOUNTER — HOSPITAL ENCOUNTER (EMERGENCY)
Facility: HOSPITAL | Age: 54
Discharge: HOME | End: 2024-07-24
Attending: EMERGENCY MEDICINE
Payer: COMMERCIAL

## 2024-07-24 VITALS
WEIGHT: 159 LBS | OXYGEN SATURATION: 99 % | HEIGHT: 61 IN | SYSTOLIC BLOOD PRESSURE: 128 MMHG | HEART RATE: 82 BPM | BODY MASS INDEX: 30.02 KG/M2 | RESPIRATION RATE: 20 BRPM | DIASTOLIC BLOOD PRESSURE: 82 MMHG | TEMPERATURE: 98 F

## 2024-07-24 DIAGNOSIS — R42 DIZZINESS: ICD-10-CM

## 2024-07-24 DIAGNOSIS — U07.1 COVID: Primary | ICD-10-CM

## 2024-07-24 DIAGNOSIS — R51.9 ACUTE NONINTRACTABLE HEADACHE, UNSPECIFIED HEADACHE TYPE: ICD-10-CM

## 2024-07-24 LAB
ALBUMIN SERPL-MCNC: 4 G/DL (ref 3.5–5)
ALBUMIN/GLOB SERPL: 0.9 RATIO (ref 1.1–2)
ALP SERPL-CCNC: 132 UNIT/L (ref 40–150)
ALT SERPL-CCNC: 36 UNIT/L (ref 0–55)
AMPHET UR QL SCN: NEGATIVE
ANION GAP SERPL CALC-SCNC: 10 MEQ/L
AST SERPL-CCNC: 22 UNIT/L (ref 5–34)
BACTERIA #/AREA URNS AUTO: ABNORMAL /HPF
BARBITURATE SCN PRESENT UR: NEGATIVE
BASOPHILS # BLD AUTO: 0.02 X10(3)/MCL
BASOPHILS NFR BLD AUTO: 0.2 %
BENZODIAZ UR QL SCN: NEGATIVE
BILIRUB SERPL-MCNC: 0.4 MG/DL
BILIRUB UR QL STRIP.AUTO: NEGATIVE
BUN SERPL-MCNC: 6.3 MG/DL (ref 9.8–20.1)
CALCIUM SERPL-MCNC: 10.3 MG/DL (ref 8.4–10.2)
CANNABINOIDS UR QL SCN: NEGATIVE
CHLORIDE SERPL-SCNC: 110 MMOL/L (ref 98–107)
CLARITY UR: CLEAR
CO2 SERPL-SCNC: 25 MMOL/L (ref 22–29)
COCAINE UR QL SCN: NEGATIVE
COLOR UR AUTO: ABNORMAL
CREAT SERPL-MCNC: 0.85 MG/DL (ref 0.55–1.02)
CREAT/UREA NIT SERPL: 7
EOSINOPHIL # BLD AUTO: 0.04 X10(3)/MCL (ref 0–0.9)
EOSINOPHIL NFR BLD AUTO: 0.3 %
ERYTHROCYTE [DISTWIDTH] IN BLOOD BY AUTOMATED COUNT: 13.1 % (ref 11.5–17)
FENTANYL UR QL SCN: NEGATIVE
FLUAV AG UPPER RESP QL IA.RAPID: NOT DETECTED
FLUBV AG UPPER RESP QL IA.RAPID: NOT DETECTED
GFR SERPLBLD CREATININE-BSD FMLA CKD-EPI: >60 ML/MIN/1.73/M2
GLOBULIN SER-MCNC: 4.6 GM/DL (ref 2.4–3.5)
GLUCOSE SERPL-MCNC: 186 MG/DL (ref 74–100)
GLUCOSE UR QL STRIP: ABNORMAL
HCT VFR BLD AUTO: 48.3 % (ref 37–47)
HGB BLD-MCNC: 15.4 G/DL (ref 12–16)
HGB UR QL STRIP: NEGATIVE
HOLD SPECIMEN: NORMAL
HOLD SPECIMEN: NORMAL
HYALINE CASTS #/AREA URNS LPF: ABNORMAL /LPF
IMM GRANULOCYTES # BLD AUTO: 0.05 X10(3)/MCL (ref 0–0.04)
IMM GRANULOCYTES NFR BLD AUTO: 0.4 %
KETONES UR QL STRIP: ABNORMAL
LEUKOCYTE ESTERASE UR QL STRIP: 75
LYMPHOCYTES # BLD AUTO: 1.92 X10(3)/MCL (ref 0.6–4.6)
LYMPHOCYTES NFR BLD AUTO: 16.7 %
MCH RBC QN AUTO: 28 PG (ref 27–31)
MCHC RBC AUTO-ENTMCNC: 31.9 G/DL (ref 33–36)
MCV RBC AUTO: 87.8 FL (ref 80–94)
MDMA UR QL SCN: NEGATIVE
MONOCYTES # BLD AUTO: 0.55 X10(3)/MCL (ref 0.1–1.3)
MONOCYTES NFR BLD AUTO: 4.8 %
MUCOUS THREADS URNS QL MICRO: ABNORMAL /LPF
NEUTROPHILS # BLD AUTO: 8.95 X10(3)/MCL (ref 2.1–9.2)
NEUTROPHILS NFR BLD AUTO: 77.6 %
NITRITE UR QL STRIP: NEGATIVE
NRBC BLD AUTO-RTO: 0 %
OHS QRS DURATION: 74 MS
OHS QTC CALCULATION: 453 MS
OPIATES UR QL SCN: NEGATIVE
PCP UR QL: NEGATIVE
PH UR STRIP: 6.5 [PH]
PH UR: 6.5 [PH] (ref 3–11)
PLATELET # BLD AUTO: 365 X10(3)/MCL (ref 130–400)
PMV BLD AUTO: 11.8 FL (ref 7.4–10.4)
POTASSIUM SERPL-SCNC: 3.9 MMOL/L (ref 3.5–5.1)
PROT SERPL-MCNC: 8.6 GM/DL (ref 6.4–8.3)
PROT UR QL STRIP: ABNORMAL
RBC # BLD AUTO: 5.5 X10(6)/MCL (ref 4.2–5.4)
RBC #/AREA URNS AUTO: ABNORMAL /HPF
SARS-COV-2 RNA RESP QL NAA+PROBE: DETECTED
SODIUM SERPL-SCNC: 145 MMOL/L (ref 136–145)
SP GR UR STRIP.AUTO: 1.01 (ref 1–1.03)
SPECIFIC GRAVITY, URINE AUTO (.000) (OHS): 1.01 (ref 1–1.03)
SQUAMOUS #/AREA URNS LPF: ABNORMAL /HPF
UROBILINOGEN UR STRIP-ACNC: NORMAL
WBC # BLD AUTO: 11.53 X10(3)/MCL (ref 4.5–11.5)
WBC #/AREA URNS AUTO: ABNORMAL /HPF

## 2024-07-24 PROCEDURE — 87086 URINE CULTURE/COLONY COUNT: CPT

## 2024-07-24 PROCEDURE — 93005 ELECTROCARDIOGRAM TRACING: CPT

## 2024-07-24 PROCEDURE — 85025 COMPLETE CBC W/AUTO DIFF WBC: CPT

## 2024-07-24 PROCEDURE — 80053 COMPREHEN METABOLIC PANEL: CPT

## 2024-07-24 PROCEDURE — 99285 EMERGENCY DEPT VISIT HI MDM: CPT | Mod: 25

## 2024-07-24 PROCEDURE — 63600175 PHARM REV CODE 636 W HCPCS

## 2024-07-24 PROCEDURE — 0240U COVID/FLU A&B PCR: CPT

## 2024-07-24 PROCEDURE — 25000003 PHARM REV CODE 250

## 2024-07-24 PROCEDURE — 80307 DRUG TEST PRSMV CHEM ANLYZR: CPT

## 2024-07-24 PROCEDURE — 96360 HYDRATION IV INFUSION INIT: CPT

## 2024-07-24 PROCEDURE — 81001 URINALYSIS AUTO W/SCOPE: CPT | Mod: XB

## 2024-07-24 RX ORDER — MECLIZINE HYDROCHLORIDE 25 MG/1
25 TABLET ORAL
Status: COMPLETED | OUTPATIENT
Start: 2024-07-24 | End: 2024-07-24

## 2024-07-24 RX ORDER — ONDANSETRON 4 MG/1
4 TABLET, ORALLY DISINTEGRATING ORAL EVERY 8 HOURS PRN
Qty: 15 TABLET | Refills: 0 | Status: SHIPPED | OUTPATIENT
Start: 2024-07-24 | End: 2024-07-29

## 2024-07-24 RX ORDER — MECLIZINE HYDROCHLORIDE 25 MG/1
25 TABLET ORAL 3 TIMES DAILY PRN
Qty: 20 TABLET | Refills: 0 | Status: SHIPPED | OUTPATIENT
Start: 2024-07-24

## 2024-07-24 RX ORDER — ONDANSETRON 4 MG/1
4 TABLET, ORALLY DISINTEGRATING ORAL
Status: COMPLETED | OUTPATIENT
Start: 2024-07-24 | End: 2024-07-24

## 2024-07-24 RX ADMIN — SODIUM CHLORIDE, POTASSIUM CHLORIDE, SODIUM LACTATE AND CALCIUM CHLORIDE 1000 ML: 600; 310; 30; 20 INJECTION, SOLUTION INTRAVENOUS at 09:07

## 2024-07-24 RX ADMIN — MECLIZINE HYDROCHLORIDE 25 MG: 25 TABLET ORAL at 08:07

## 2024-07-24 RX ADMIN — ONDANSETRON 4 MG: 4 TABLET, ORALLY DISINTEGRATING ORAL at 08:07

## 2024-07-24 NOTE — Clinical Note
"Zonia Beltran" Felix was seen and treated in our emergency department on 7/24/2024.  She may return to work on 07/29/2024.       If you have any questions or concerns, please don't hesitate to call.      Jackie Tobar, DEYSI"

## 2024-07-24 NOTE — ED PROVIDER NOTES
"Encounter Date: 7/24/2024       History     Chief Complaint   Patient presents with    Headache     Reports headache and dizziness since Tuesday. Symptoms progressed since Tuesday and this morning she vomited.      52 y/o female with PMH of DM, HLD, Psoriasis, presents to [facility] ER with a complaint of dizziness for two days. The dizziness is present when she is moving or laying flat. Her dizziness is described as the "room spinning". Staying still and propping herself up on two pillows helps her dizziness, movement makes it worse. Associated symptoms include mild frontal headache, photopia, nausea, and three episodes of vomiting. Her symptoms began two days ago and have worsened over the last twelve hours. She denies fever, recent infections, cough, cold, congestion, rhinorrhea, vision changes, chest pain sob, unilateral weakness. No other complaints    The history is provided by the patient.     Review of patient's allergies indicates:   Allergen Reactions    Latex Hives, Itching and Rash     Past Medical History:   Diagnosis Date    Mixed hyperlipidemia 10/31/2023    Obesity, unspecified     Psoriasis 5/29/2022    Type 2 diabetes mellitus without complications      Past Surgical History:   Procedure Laterality Date    CHOLECYSTECTOMY      DILATION AND CURETTAGE OF UTERUS      HERNIA REPAIR      HERNIA REPAIR      HYSTERECTOMY       Family History   Problem Relation Name Age of Onset    Diabetes Mother Niecee Felix     Cancer Mother Niecee Felix     Cancer Father Carlos Murphy      Social History     Tobacco Use    Smoking status: Never    Smokeless tobacco: Never   Substance Use Topics    Alcohol use: Yes     Comment: Occasionally    Drug use: Never     Review of Systems   Constitutional: Negative.    HENT: Negative.     Eyes: Negative.    Respiratory: Negative.     Cardiovascular: Negative.    Gastrointestinal:  Positive for nausea and vomiting. Negative for abdominal distention, abdominal pain, anal bleeding, blood in " stool, constipation, diarrhea and rectal pain.        Three episode of vomiting the last twelve hours   Endocrine: Negative.    Genitourinary: Negative.    Musculoskeletal: Negative.    Skin: Negative.    Allergic/Immunologic: Negative.    Neurological:  Positive for dizziness and headaches. Negative for tremors, seizures, syncope, facial asymmetry, speech difficulty, weakness, light-headedness and numbness.        Dizziness x two days   Psychiatric/Behavioral: Negative.     All other systems reviewed and are negative.      Physical Exam     Initial Vitals [07/24/24 0732]   BP Pulse Resp Temp SpO2   137/84 80 20 97.6 °F (36.4 °C) 97 %      MAP       --         Physical Exam    Nursing note and vitals reviewed.  Constitutional: Vital signs are normal. She appears well-developed and well-nourished. She is not diaphoretic. She is cooperative.  Non-toxic appearance. She does not have a sickly appearance. She does not appear ill. No distress.   HENT:   Head: Normocephalic and atraumatic.   Right Ear: Hearing normal.   Left Ear: Hearing normal.   Nose: Nose normal.   Mouth/Throat: Uvula is midline, oropharynx is clear and moist and mucous membranes are normal.   Eyes: Conjunctivae and EOM are normal. Pupils are equal, round, and reactive to light.   Neck: Trachea normal and phonation normal. Neck supple.   Normal range of motion.  Cardiovascular:  Normal rate, regular rhythm, normal heart sounds, intact distal pulses and normal pulses.           Pulmonary/Chest: Effort normal and breath sounds normal. No accessory muscle usage. No tachypnea and no bradypnea. No respiratory distress. She has no decreased breath sounds. She has no wheezes. She has no rhonchi. She has no rales. She exhibits no tenderness.   Normal effort, symmetrical chest rise and fall, no accessory muscle use. Bilateral clear breath sounds in all fields anterior and posterior.      Abdominal: Abdomen is soft. Bowel sounds are normal. She exhibits no  distension. There is no abdominal tenderness.   Abdomen is soft, nontender, no peritoneal signs. Tolerating PO fluids.    There is no rebound.   Musculoskeletal:         General: Normal range of motion.      Cervical back: Normal range of motion and neck supple.     Neurological: She is alert and oriented to person, place, and time. She has normal strength. No cranial nerve deficit or sensory deficit. GCS score is 15. GCS eye subscore is 4. GCS verbal subscore is 5. GCS motor subscore is 6.   Skin: Skin is warm, dry and intact. Capillary refill takes less than 2 seconds. No pallor.   Psychiatric: She has a normal mood and affect. Thought content normal.         ED Course   Procedures  Labs Reviewed   COMPREHENSIVE METABOLIC PANEL - Abnormal       Result Value    Sodium 145      Potassium 3.9      Chloride 110 (*)     CO2 25      Glucose 186 (*)     Blood Urea Nitrogen 6.3 (*)     Creatinine 0.85      Calcium 10.3 (*)     Protein Total 8.6 (*)     Albumin 4.0      Globulin 4.6 (*)     Albumin/Globulin Ratio 0.9 (*)     Bilirubin Total 0.4            ALT 36      AST 22      eGFR >60      Anion Gap 10.0      BUN/Creatinine Ratio 7     COVID/FLU A&B PCR - Abnormal    Influenza A PCR Not Detected      Influenza B PCR Not Detected      SARS-CoV-2 PCR Detected (*)     Narrative:     CT = 31.7  The Xpert Xpress SARS-CoV-2/FLU/RSV plus is a rapid, multiplexed real-time PCR test intended for the simultaneous qualitative detection and differentiation of SARS-CoV-2, Influenza A, Influenza B, and respiratory syncytial virus (RSV) viral RNA in either nasopharyngeal swab or nasal swab specimens.         URINALYSIS, REFLEX TO URINE CULTURE - Abnormal    Color, UA Light-Yellow      Appearance, UA Clear      Specific Gravity, UA 1.010      pH, UA 6.5      Protein, UA Trace (*)     Glucose, UA 1+ (*)     Ketones, UA 1+ (*)     Blood, UA Negative      Bilirubin, UA Negative      Urobilinogen, UA Normal      Nitrites, UA Negative       Leukocyte Esterase, UA 75 (*)     RBC, UA 0-5      WBC, UA 11-20 (*)     Bacteria, UA None Seen      Squamous Epithelial Cells, UA Trace (*)     Mucous, UA Trace (*)     Hyaline Casts, UA None Seen     CBC WITH DIFFERENTIAL - Abnormal    WBC 11.53 (*)     RBC 5.50 (*)     Hgb 15.4      Hct 48.3 (*)     MCV 87.8      MCH 28.0      MCHC 31.9 (*)     RDW 13.1      Platelet 365      MPV 11.8 (*)     Neut % 77.6      Lymph % 16.7      Mono % 4.8      Eos % 0.3      Basophil % 0.2      Lymph # 1.92      Neut # 8.95      Mono # 0.55      Eos # 0.04      Baso # 0.02      IG# 0.05 (*)     IG% 0.4      NRBC% 0.0     DRUG SCREEN, URINE (BEAKER) - Normal    Amphetamines, Urine Negative      Barbiturates, Urine Negative      Benzodiazepine, Urine Negative      Cannabinoids, Urine Negative      Cocaine, Urine Negative      Fentanyl, Urine Negative      MDMA, Urine Negative      Opiates, Urine Negative      Phencyclidine, Urine Negative      pH, Urine 6.5      Specific Gravity, Urine Auto 1.010      Narrative:     Cut off concentrations:    Amphetamines - 1000 ng/ml  Barbiturates - 200 ng/ml  Benzodiazepine - 200 ng/ml  Cannabinoids (THC) - 50 ng/ml  Cocaine - 300 ng/ml  Fentanyl - 1.0 ng/ml  MDMA - 500 ng/ml  Opiates - 300 ng/ml   Phencyclidine (PCP) - 25 ng/ml    Specimen submitted for drug analysis and tested for pH and specific gravity in order to evaluate sample integrity. Suspect tampering if specific gravity is <1.003 and/or pH is not within the range of 4.5 - 8.0  False negatives may result form substances such as bleach added to urine.  False positives may result for the presence of a substance with similar chemical structure to the drug or its metabolite.    This test provides only a PRELIMINARY analytical test result. A more specific alternate chemical method must be used in order to obtain a confirmed analytical result. Gas chromatography/mass spectrometry (GC/MS) is the preferred confirmatory method. Other  chemical confirmation methods are available. Clinical consideration and professional judgement should be applied to any drug of abuse test result, particularly when preliminary positive results are used.    Positive results will be confirmed only at the physicians request. Unconfirmed screening results are to be used only for medical purposes (treatment).        CULTURE, URINE   CBC W/ AUTO DIFFERENTIAL    Narrative:     The following orders were created for panel order CBC Auto Differential.  Procedure                               Abnormality         Status                     ---------                               -----------         ------                     CBC with Differential[[id number]]       Abnormal            Final result                 Please view results for these tests on the individual orders.   EXTRA TUBES    Narrative:     The following orders were created for panel order EXTRA TUBES.  Procedure                               Abnormality         Status                     ---------                               -----------         ------                     Light Blue Top Hold[[id number]]                             Final result               Gold Top Hold[[id number]]                                   Final result                 Please view results for these tests on the individual orders.   LIGHT BLUE TOP HOLD    Extra Tube Hold for add-ons.     GOLD TOP HOLD    Extra Tube Hold for add-ons.          ECG Results              EKG 12-lead (Final result)        Collection Time Result Time QRS Duration [facility] QTC Calculation    07/24/24 09:48:32 07/24/24 13:39:54 74 453                     Final result by Interface, Lab In [facility] (07/24/24 13:40:04)                   Narrative:    Test Reason : R42,    Vent. Rate : 075 BPM     Atrial Rate : 075 BPM     P-R Int : 116 ms          QRS Dur : 074 ms      QT Int : 406 ms       P-R-T Axes : 063 070 028 degrees     QTc Int : 453 ms    Sinus rhythm with  occasional Premature ventricular complexes  Otherwise normal ECG  When compared with ECG of 16-MAY-2024 08:51,  Premature ventricular complexes are now Present  Confirmed by Delta Severino MD ([id number]) on 7/24/2024 1:39:53 PM    Referred By: LIZET   SELF           Confirmed By:Delta Severino MD                                  Imaging Results              CT Head Without Contrast (Final result)  Result time 07/24/24 10:51:00      Final result by Jarett Nava MD (07/24/24 10:51:00)                   Impression:      No acute intracranial findings.      Electronically signed by: Jarett Nava  Date:    07/24/2024  Time:    10:51               Narrative:    EXAMINATION:  CT HEAD WITHOUT CONTRAST    CLINICAL HISTORY:  Headache, new or worsening (Age >= 50y);    TECHNIQUE:  CT imaging of the head performed from the skull base to the vertex without intravenous contrast. DLP 1032 mGycm. Automatic exposure control, adjustment of mA/kV or iterative reconstruction technique was used to reduce radiation.    COMPARISON:  None Available.    FINDINGS:  There is no acute cortical infarct, hemorrhage or mass lesion.  The ventricles are normal in size.    Visualized paranasal sinuses and mastoid air cells are clear.                                       Medications   ondansetron disintegrating tablet 4 mg (4 mg Oral Given 7/24/24 0810)   meclizine tablet 25 mg (25 mg Oral Given 7/24/24 0810)   lactated ringers bolus 1,000 mL (0 mLs Intravenous Stopped 7/24/24 1047)     Medical Decision Making  Peripheral vertigo, Central Vertigo, Cerumen impaction, orthostatic dizziness, Brain Mass, cardiac Dysrhythmias, among others    CBC normal  CMP noral  IV fluids provided   Covid positive  CT negative    Patient given results advised to treat symptoms, er precautions discussed. Instructed to follow up with PCP. Patient is nontoxic appearing, no acute distress, stable vital signs.     The patient is resting comfortably in no acute distress.   hemodynamically stable and is without objective evidence for acute process requiring urgent intervention or hospitalization. I provided counseling to patient with regard to condition, the treatment plan, specific conditions for return, and the importance of follow up. Detailed written and verbal instructions provided to patient and he expressed a verbal understanding of the discharge instructions and overall management plan. Reiterated the importance of medication administration and safety and advised patient to follow up with primary care provider in 3-5 days or sooner if needed. Answered questions at this time. The patient is stable for discharge.         Amount and/or Complexity of Data Reviewed  Labs: ordered. Decision-making details documented in ED Course.  Radiology: ordered.    Risk  Prescription drug management.               ED Course as of 07/24/24 1551   Wed Jul 24, 2024   1058 CBC Auto Differential(!)  WBC 11.53  RBC 5.50  Hematocrit 48.3  MCHC 31.9  MPV 11.8  Immature Grans (Abs) 0.05      [RQ]   1059 Comprehensive Metabolic Panel(!)      Comprehensive Metabolic Panel    Final resultCollected 07/24 0818    Chloride 110  Glucose 186  BUN 6.3  Calcium 10.3  PROTEIN TOTAL 8.6  Globulin, Total 4.6  Albumin/Globulin Ratio 0.9          [RQ]   1059 COVID/FLU A&B PCR(!)  Detected [RQ]      ED Course User Index  [RQ] Jackie Tobar FNP                           Clinical Impression:  Final diagnoses:  [R42] Dizziness  [U07.1] COVID (Primary)  [R51.9] Acute nonintractable headache, unspecified headache type          ED Disposition Condition    Discharge Stable          ED Prescriptions       Medication Sig Dispense Start Date End Date Auth. Provider    meclizine (ANTIVERT) 25 mg tablet Take 1 tablet (25 mg total) by mouth 3 (three) times daily as needed for Dizziness. 20 tablet 7/24/2024 -- Jackie Tobar FNP    ondansetron (ZOFRAN-ODT) 4 MG TbDL Take 1 tablet (4 mg total) by mouth every 8 (eight) hours as  needed (nausea). 15 tablet 7/24/2024 7/29/2024 Jackie Tobar FNP          Follow-up Information       Follow up With Specialties Details Why Contact Info    Zara Fairbanks MD Family Medicine In 3 days  [address]  [address]  [facility] [address]  [phone]      [facility] Emergency Medicine  If symptoms worsen [address] [address] [phone]             Jackie Tobar FNP  07/24/24 3549

## 2024-07-26 LAB — BACTERIA UR CULT: NO GROWTH

## 2024-08-01 ENCOUNTER — OFFICE VISIT (OUTPATIENT)
Dept: PAIN MEDICINE | Facility: CLINIC | Age: 54
End: 2024-08-01
Payer: COMMERCIAL

## 2024-08-01 VITALS
WEIGHT: 159 LBS | DIASTOLIC BLOOD PRESSURE: 71 MMHG | SYSTOLIC BLOOD PRESSURE: 121 MMHG | TEMPERATURE: 98 F | HEIGHT: 61 IN | BODY MASS INDEX: 30.02 KG/M2

## 2024-08-01 DIAGNOSIS — G89.29 CHRONIC NECK PAIN: Primary | ICD-10-CM

## 2024-08-01 DIAGNOSIS — M54.12 CERVICAL RADICULITIS: ICD-10-CM

## 2024-08-01 DIAGNOSIS — M50.30 DDD (DEGENERATIVE DISC DISEASE), CERVICAL: ICD-10-CM

## 2024-08-01 DIAGNOSIS — M54.2 CHRONIC NECK PAIN: Primary | ICD-10-CM

## 2024-08-01 NOTE — H&P (VIEW-ONLY)
"  [facility]    Subjective:     Chief Complaint: Pre-op Exam (Pre op CA C7-T1 8/19/24 C/O pain level 3, not taking pain meds)    Referred by: No ref. provider found     History of Present Illness: Zonia Washington is a 53 y.o. female presents today for follow up of her chronic neck pain with radiation down the right upper extremity.  Patient had right arm pain starting May 14th that occurred spontaneously.  She was evaluated by her primary physician who sent her to ortho to see Dr. Thony guy even though there was some change in her right rotator cuff he ruled out the majority of her right arm pain was not coming from her rotator cuff instead it was originating from her neck.  Review of her cervical MRI shows multi-level disc osteophyte complexes in the summary it states multilevel of spondylitic changes.  Patient completed 12 weeks of physical therapy that reduced her right arm and hand pain by about 50% however she is has ongoing pain to the back of her neck as well.  She also completes home exercises and reports having some benefit using the 10s unit during physical therapy.  She has no history of spinal procedures, pacemaker or defibrillator.  She does have pertinent past medical history of psoriasis with infusion of Tremfya.  Additionally she takes aspirin 81 mg.  Pain will elevate when driving and causes some insomnia.  Once she finds a comfortable position she stays there as her pain will elevate to a 8/10.  She denies any saddle anesthesia, fecal incontinence and urinary retention.  Her pain will reduce slightly when she stops the activity and uses heat she will also takes some Tylenol as needed.    Vital signs:   Visit Vitals  /71 (BP Location: Right arm, Patient Position: Sitting, BP Method: Medium (Automatic))   Temp 98.4 °F (36.9 °C)   Ht 5' 1" (1.549 m)   Wt 72.1 kg (159 lb)   BMI 30.04 kg/m²      Vitals:    08/01/24 1511   PainSc:   3     Pain Disability Index (PDI): 32   "     Interventional Pain History  None    Review of Systems   All other systems reviewed and are negative.  : Neck, back and leg pain    Cervical MRI 2024:   MRI CERVICAL SPINE WITHOUT CONTRAST     CLINICAL HISTORY:  Neck pain, chronic, degenerative changes on xray;  Other spondylosis with radiculopathy, cervical region     TECHNIQUE:  Multiplanar, multisequence MR images of the cervical spine were acquired without the administration of contrast.     COMPARISON:  Cervical spine radiographs 12/04/2023.     FINDINGS:  CORD: Normal size and signal.  No syrinx.  Cervicomedullary junction is normal.     ALIGNMENT: Normal.  Lateral masses of C1 and C2 are congruent.     BONES: Vertebral body heights are maintained.  No aggressive bone marrow signal.     PARASPINAL AREA: Normal.     CERVICAL DISC LEVELS:     C2-C3: Unremarkable.     C3-C4: Broad-based disc osteophyte more pronounced on the right causing mild central canal stenosis and moderate right foraminal stenosis.  The left foramina is patent.     C4-C5: Broad-based disc osteophyte causing mild central canal stenosis and mild bilateral foraminal stenosis.     C5-C6: Unremarkable.     C6-C7: Shallow broad based disc osteophyte causing mild central canal stenosis.  No significant foraminal stenosis.     C7-T1: Unremarkable.     Impression:     Multilevel cervical spondylosis as detailed on a level by level basis.        Electronically signed by:Armond Perales  Date:                                            06/11/2024  Time:                                           15:48    Lumbar spine :   None obtained    Objective:        Physical Exam  General: Well developed; normal weight; A&O x 3; No anxiety/depression; NAD  Mental Status: Oriented to person, palce and time. Displays appropriate mood & affect.  Head: Norm cephalic and atraumatic  Neck:  No cervical paraspinal banding.  Full range of motion with lateral turning and cervical flexion +extension.equal hand   bilaterally  Eyes: normal conjunctiva, normal lids, normal pupils  ENT and mouth: normal external ear, nose, and no lesions noted on the lips.  Respiratory: Symmetrical, Unlabored. No dyspnea  CV: normal rhythm and rate. No peripheral edema.   Abdomen: Non-distended    Extremities:  Gen: No cyanosis or tenderness to palpation bilateral upper and lower extremities  Skin: Warm, pink, dry, no rashes, no lesions on the lumbar spine  Strength: 5/5 motor strength bilateral upper and lower extremities  ROM: Full ROM in bilateral knees and ankles without pain or instability.    Neuro:  Gait: no altalgic lean, normal toe and heel raise. Independent ambulator.  DTR's: 2+ in bilateral patella  Sensory: Intact to light touch bilateral  upper and lower extremities    Spine: Normal lordosis. mild scoliosis  L-spine ROM: Full ROM to flexion, extension, bilateral rotation,   Straight Leg Raise: neg bilaterally  SI Joint: No tenderness to palpation bilaterally.      Assessment:       Encounter Diagnoses   Name Primary?    Chronic neck pain Yes    Cervical radiculitis     DDD (degenerative disc disease), cervical          Plan:       Zonia was seen today for pre-op exam.    Diagnoses and all orders for this visit:    Chronic neck pain    Cervical radiculitis    DDD (degenerative disc disease), cervical       We will proceed with her cervical epidural steroid injection as scheduled in a couple of weeks.  The plan was discussed with the patient and she wishes to proceed.        Past Medical History:   Diagnosis Date    Mixed hyperlipidemia 10/31/2023    Obesity, unspecified     Psoriasis 5/29/2022    Type 2 diabetes mellitus without complications        Past Surgical History:   Procedure Laterality Date    CHOLECYSTECTOMY      DILATION AND CURETTAGE OF UTERUS      HERNIA REPAIR      HERNIA REPAIR      HYSTERECTOMY         Family History   Problem Relation Name Age of Onset    Diabetes Mother Nieceviraj Washington     Cancer Mother Merissa Washington      Cancer Father Carlos Murphy        Social History     Socioeconomic History    Marital status:    Tobacco Use    Smoking status: Never    Smokeless tobacco: Never   Substance and Sexual Activity    Alcohol use: Yes     Comment: Occasionally    Drug use: Never    Sexual activity: Not Currently     Partners: Male     Birth control/protection: None   Social History Narrative    ** Merged History Encounter **          Social Determinants of Health     Financial Resource Strain: Low Risk  (11/29/2023)    Overall Financial Resource Strain (CARDIA)     Difficulty of Paying Living Expenses: Not hard at all   Food Insecurity: No Food Insecurity (11/29/2023)    Hunger Vital Sign     Worried About Running Out of Food in the Last Year: Never true     Ran Out of Food in the Last Year: Never true   Transportation Needs: No Transportation Needs (11/29/2023)    PRAPARE - Transportation     Lack of Transportation (Medical): No     Lack of Transportation (Non-Medical): No   Physical Activity: Insufficiently Active (11/29/2023)    Exercise Vital Sign     Days of Exercise per Week: 5 days     Minutes of Exercise per Session: 10 min   Stress: No Stress Concern Present (11/29/2023)    [employer] - Occupational Stress Questionnaire     Feeling of Stress : Not at all   Housing Stability: Low Risk  (11/29/2023)    Housing Stability Vital Sign     Unable to Pay for Housing in the Last Year: No     Number of Places Lived in the Last Year: 1     Unstable Housing in the Last Year: No       Current Outpatient Medications   Medication Sig Dispense Refill    aspirin (ECOTRIN) 81 MG EC tablet Take 81 mg by mouth.      cetirizine (ZYRTEC) 10 MG tablet Take 10 mg by mouth.      cholecalciferol, vitamin D3, (VITAMIN D3) 10 mcg (400 unit) Tab       diclofenac sodium (VOLTAREN) 1 % Gel Apply 2 g topically 4 (four) times daily as needed (pain/inflammation). 100 g 1    Lactobacillus acidophilus (ACIDOPHILUS ORAL)  Take by mouth Daily.      meclizine (ANTIVERT) 25 mg tablet Take 1 tablet (25 mg total) by mouth 3 (three) times daily as needed for Dizziness. 20 tablet 0    multivit with min-folic acid (ONE-A-DAY WOMEN'S 50 PLUS) 0.4 mg Tab       rosuvastatin (CRESTOR) 5 MG tablet Take 1 tablet (5 mg total) by mouth nightly. 90 tablet 3    acitretin (SORIATANE) 10 MG capsule Take by mouth.      fluconazole (DIFLUCAN) 100 MG tablet Take 100 mg by mouth. 3 tablets weekly for five weeks      gabapentin (NEURONTIN) 300 MG capsule Take 1 capsule (300 mg total) by mouth every evening. 20 capsule 0    meloxicam (MOBIC) 7.5 MG tablet Take 1 tablet (7.5 mg total) by mouth once daily. 20 tablet 0    predniSONE (DELTASONE) 10 MG tablet Take by mouth.       No current facility-administered medications for this visit.       Review of patient's allergies indicates:   Allergen Reactions    Latex Hives, Itching and Rash

## 2024-08-01 NOTE — PROGRESS NOTES
"  [facility]    Subjective:     Chief Complaint: Pre-op Exam (Pre op CA C7-T1 8/19/24 C/O pain level 3, not taking pain meds)    Referred by: No ref. provider found     History of Present Illness: Zonia Washington is a 53 y.o. female presents today for follow up of her chronic neck pain with radiation down the right upper extremity.  Patient had right arm pain starting May 14th that occurred spontaneously.  She was evaluated by her primary physician who sent her to ortho to see Dr. Thony guy even though there was some change in her right rotator cuff he ruled out the majority of her right arm pain was not coming from her rotator cuff instead it was originating from her neck.  Review of her cervical MRI shows multi-level disc osteophyte complexes in the summary it states multilevel of spondylitic changes.  Patient completed 12 weeks of physical therapy that reduced her right arm and hand pain by about 50% however she is has ongoing pain to the back of her neck as well.  She also completes home exercises and reports having some benefit using the 10s unit during physical therapy.  She has no history of spinal procedures, pacemaker or defibrillator.  She does have pertinent past medical history of psoriasis with infusion of Tremfya.  Additionally she takes aspirin 81 mg.  Pain will elevate when driving and causes some insomnia.  Once she finds a comfortable position she stays there as her pain will elevate to a 8/10.  She denies any saddle anesthesia, fecal incontinence and urinary retention.  Her pain will reduce slightly when she stops the activity and uses heat she will also takes some Tylenol as needed.    Vital signs:   Visit Vitals  /71 (BP Location: Right arm, Patient Position: Sitting, BP Method: Medium (Automatic))   Temp 98.4 °F (36.9 °C)   Ht 5' 1" (1.549 m)   Wt 72.1 kg (159 lb)   BMI 30.04 kg/m²      Vitals:    08/01/24 1511   PainSc:   3     Pain Disability Index (PDI): 32   "     Interventional Pain History  None    Review of Systems   All other systems reviewed and are negative.  : Neck, back and leg pain    Cervical MRI 2024:   MRI CERVICAL SPINE WITHOUT CONTRAST     CLINICAL HISTORY:  Neck pain, chronic, degenerative changes on xray;  Other spondylosis with radiculopathy, cervical region     TECHNIQUE:  Multiplanar, multisequence MR images of the cervical spine were acquired without the administration of contrast.     COMPARISON:  Cervical spine radiographs 12/04/2023.     FINDINGS:  CORD: Normal size and signal.  No syrinx.  Cervicomedullary junction is normal.     ALIGNMENT: Normal.  Lateral masses of C1 and C2 are congruent.     BONES: Vertebral body heights are maintained.  No aggressive bone marrow signal.     PARASPINAL AREA: Normal.     CERVICAL DISC LEVELS:     C2-C3: Unremarkable.     C3-C4: Broad-based disc osteophyte more pronounced on the right causing mild central canal stenosis and moderate right foraminal stenosis.  The left foramina is patent.     C4-C5: Broad-based disc osteophyte causing mild central canal stenosis and mild bilateral foraminal stenosis.     C5-C6: Unremarkable.     C6-C7: Shallow broad based disc osteophyte causing mild central canal stenosis.  No significant foraminal stenosis.     C7-T1: Unremarkable.     Impression:     Multilevel cervical spondylosis as detailed on a level by level basis.        Electronically signed by:Armond Perales  Date:                                            06/11/2024  Time:                                           15:48    Lumbar spine :   None obtained    Objective:        Physical Exam  General: Well developed; normal weight; A&O x 3; No anxiety/depression; NAD  Mental Status: Oriented to person, palce and time. Displays appropriate mood & affect.  Head: Norm cephalic and atraumatic  Neck:  No cervical paraspinal banding.  Full range of motion with lateral turning and cervical flexion +extension.equal hand   bilaterally  Eyes: normal conjunctiva, normal lids, normal pupils  ENT and mouth: normal external ear, nose, and no lesions noted on the lips.  Respiratory: Symmetrical, Unlabored. No dyspnea  CV: normal rhythm and rate. No peripheral edema.   Abdomen: Non-distended    Extremities:  Gen: No cyanosis or tenderness to palpation bilateral upper and lower extremities  Skin: Warm, pink, dry, no rashes, no lesions on the lumbar spine  Strength: 5/5 motor strength bilateral upper and lower extremities  ROM: Full ROM in bilateral knees and ankles without pain or instability.    Neuro:  Gait: no altalgic lean, normal toe and heel raise. Independent ambulator.  DTR's: 2+ in bilateral patella  Sensory: Intact to light touch bilateral  upper and lower extremities    Spine: Normal lordosis. mild scoliosis  L-spine ROM: Full ROM to flexion, extension, bilateral rotation,   Straight Leg Raise: neg bilaterally  SI Joint: No tenderness to palpation bilaterally.      Assessment:       Encounter Diagnoses   Name Primary?    Chronic neck pain Yes    Cervical radiculitis     DDD (degenerative disc disease), cervical          Plan:       Zonia was seen today for pre-op exam.    Diagnoses and all orders for this visit:    Chronic neck pain    Cervical radiculitis    DDD (degenerative disc disease), cervical       We will proceed with her cervical epidural steroid injection as scheduled in a couple of weeks.  The plan was discussed with the patient and she wishes to proceed.        Past Medical History:   Diagnosis Date    Mixed hyperlipidemia 10/31/2023    Obesity, unspecified     Psoriasis 5/29/2022    Type 2 diabetes mellitus without complications        Past Surgical History:   Procedure Laterality Date    CHOLECYSTECTOMY      DILATION AND CURETTAGE OF UTERUS      HERNIA REPAIR      HERNIA REPAIR      HYSTERECTOMY         Family History   Problem Relation Name Age of Onset    Diabetes Mother Nieceviraj Washington     Cancer Mother Merissa Washington      Cancer Father Carlos Murphy        Social History     Socioeconomic History    Marital status:    Tobacco Use    Smoking status: Never    Smokeless tobacco: Never   Substance and Sexual Activity    Alcohol use: Yes     Comment: Occasionally    Drug use: Never    Sexual activity: Not Currently     Partners: Male     Birth control/protection: None   Social History Narrative    ** Merged History Encounter **          Social Determinants of Health     Financial Resource Strain: Low Risk  (11/29/2023)    Overall Financial Resource Strain (CARDIA)     Difficulty of Paying Living Expenses: Not hard at all   Food Insecurity: No Food Insecurity (11/29/2023)    Hunger Vital Sign     Worried About Running Out of Food in the Last Year: Never true     Ran Out of Food in the Last Year: Never true   Transportation Needs: No Transportation Needs (11/29/2023)    PRAPARE - Transportation     Lack of Transportation (Medical): No     Lack of Transportation (Non-Medical): No   Physical Activity: Insufficiently Active (11/29/2023)    Exercise Vital Sign     Days of Exercise per Week: 5 days     Minutes of Exercise per Session: 10 min   Stress: No Stress Concern Present (11/29/2023)    [employer] - Occupational Stress Questionnaire     Feeling of Stress : Not at all   Housing Stability: Low Risk  (11/29/2023)    Housing Stability Vital Sign     Unable to Pay for Housing in the Last Year: No     Number of Places Lived in the Last Year: 1     Unstable Housing in the Last Year: No       Current Outpatient Medications   Medication Sig Dispense Refill    aspirin (ECOTRIN) 81 MG EC tablet Take 81 mg by mouth.      cetirizine (ZYRTEC) 10 MG tablet Take 10 mg by mouth.      cholecalciferol, vitamin D3, (VITAMIN D3) 10 mcg (400 unit) Tab       diclofenac sodium (VOLTAREN) 1 % Gel Apply 2 g topically 4 (four) times daily as needed (pain/inflammation). 100 g 1    Lactobacillus acidophilus (ACIDOPHILUS ORAL)  Take by mouth Daily.      meclizine (ANTIVERT) 25 mg tablet Take 1 tablet (25 mg total) by mouth 3 (three) times daily as needed for Dizziness. 20 tablet 0    multivit with min-folic acid (ONE-A-DAY WOMEN'S 50 PLUS) 0.4 mg Tab       rosuvastatin (CRESTOR) 5 MG tablet Take 1 tablet (5 mg total) by mouth nightly. 90 tablet 3    acitretin (SORIATANE) 10 MG capsule Take by mouth.      fluconazole (DIFLUCAN) 100 MG tablet Take 100 mg by mouth. 3 tablets weekly for five weeks      gabapentin (NEURONTIN) 300 MG capsule Take 1 capsule (300 mg total) by mouth every evening. 20 capsule 0    meloxicam (MOBIC) 7.5 MG tablet Take 1 tablet (7.5 mg total) by mouth once daily. 20 tablet 0    predniSONE (DELTASONE) 10 MG tablet Take by mouth.       No current facility-administered medications for this visit.       Review of patient's allergies indicates:   Allergen Reactions    Latex Hives, Itching and Rash

## 2024-08-18 ENCOUNTER — PATIENT MESSAGE (OUTPATIENT)
Dept: ADMINISTRATIVE | Facility: OTHER | Age: 54
End: 2024-08-18
Payer: COMMERCIAL

## 2024-08-19 ENCOUNTER — ANESTHESIA EVENT (OUTPATIENT)
Dept: SURGERY | Facility: HOSPITAL | Age: 54
End: 2024-08-19
Payer: COMMERCIAL

## 2024-08-19 ENCOUNTER — ANESTHESIA (OUTPATIENT)
Dept: SURGERY | Facility: HOSPITAL | Age: 54
End: 2024-08-19
Payer: COMMERCIAL

## 2024-08-19 ENCOUNTER — PATIENT MESSAGE (OUTPATIENT)
Dept: ADMINISTRATIVE | Facility: OTHER | Age: 54
End: 2024-08-19
Payer: COMMERCIAL

## 2024-08-19 ENCOUNTER — HOSPITAL ENCOUNTER (OUTPATIENT)
Facility: HOSPITAL | Age: 54
Discharge: HOME | End: 2024-08-19
Attending: ANESTHESIOLOGY | Admitting: ANESTHESIOLOGY
Payer: COMMERCIAL

## 2024-08-19 DIAGNOSIS — M54.2 CHRONIC NECK PAIN: ICD-10-CM

## 2024-08-19 DIAGNOSIS — G89.29 CHRONIC NECK PAIN: ICD-10-CM

## 2024-08-19 PROCEDURE — 62321 NJX INTERLAMINAR CRV/THRC: CPT | Mod: ,,, | Performed by: ANESTHESIOLOGY

## 2024-08-19 PROCEDURE — 63600175 PHARM REV CODE 636 W HCPCS: Performed by: ANESTHESIOLOGY

## 2024-08-19 PROCEDURE — D9220A PRA ANESTHESIA: Mod: CRNA,,, | Performed by: NURSE ANESTHETIST, CERTIFIED REGISTERED

## 2024-08-19 PROCEDURE — 63600175 PHARM REV CODE 636 W HCPCS: Performed by: NURSE ANESTHETIST, CERTIFIED REGISTERED

## 2024-08-19 PROCEDURE — A4216 STERILE WATER/SALINE, 10 ML: HCPCS | Performed by: ANESTHESIOLOGY

## 2024-08-19 PROCEDURE — 25000003 PHARM REV CODE 250: Performed by: ANESTHESIOLOGY

## 2024-08-19 PROCEDURE — D9220A PRA ANESTHESIA: Mod: ANES,,, | Performed by: ANESTHESIOLOGY

## 2024-08-19 PROCEDURE — 62321 NJX INTERLAMINAR CRV/THRC: CPT | Performed by: ANESTHESIOLOGY

## 2024-08-19 PROCEDURE — 37000008 HC ANESTHESIA 1ST 15 MINUTES: Performed by: ANESTHESIOLOGY

## 2024-08-19 RX ORDER — DEXAMETHASONE SODIUM PHOSPHATE 10 MG/ML
INJECTION INTRAMUSCULAR; INTRAVENOUS
Status: DISCONTINUED | OUTPATIENT
Start: 2024-08-19 | End: 2024-08-19 | Stop reason: HOSPADM

## 2024-08-19 RX ORDER — ONDANSETRON HYDROCHLORIDE 2 MG/ML
4 INJECTION, SOLUTION INTRAVENOUS ONCE AS NEEDED
Status: DISCONTINUED | OUTPATIENT
Start: 2024-08-19 | End: 2024-08-19 | Stop reason: HOSPADM

## 2024-08-19 RX ORDER — GLUCAGON 1 MG
1 KIT INJECTION
Status: DISCONTINUED | OUTPATIENT
Start: 2024-08-19 | End: 2024-08-19 | Stop reason: HOSPADM

## 2024-08-19 RX ORDER — LIDOCAINE HYDROCHLORIDE 10 MG/ML
INJECTION, SOLUTION EPIDURAL; INFILTRATION; INTRACAUDAL; PERINEURAL
Status: DISCONTINUED
Start: 2024-08-19 | End: 2024-08-19 | Stop reason: HOSPADM

## 2024-08-19 RX ORDER — PROPOFOL 10 MG/ML
VIAL (ML) INTRAVENOUS
Status: DISCONTINUED | OUTPATIENT
Start: 2024-08-19 | End: 2024-08-19

## 2024-08-19 RX ORDER — DEXAMETHASONE SODIUM PHOSPHATE 10 MG/ML
INJECTION INTRAMUSCULAR; INTRAVENOUS
Status: DISCONTINUED
Start: 2024-08-19 | End: 2024-08-19 | Stop reason: HOSPADM

## 2024-08-19 RX ORDER — SODIUM CHLORIDE 0.9 % (FLUSH) 0.9 %
SYRINGE (ML) INJECTION
Status: DISCONTINUED | OUTPATIENT
Start: 2024-08-19 | End: 2024-08-19 | Stop reason: HOSPADM

## 2024-08-19 RX ORDER — LIDOCAINE HYDROCHLORIDE 10 MG/ML
INJECTION, SOLUTION EPIDURAL; INFILTRATION; INTRACAUDAL; PERINEURAL
Status: DISCONTINUED | OUTPATIENT
Start: 2024-08-19 | End: 2024-08-19 | Stop reason: HOSPADM

## 2024-08-19 RX ADMIN — PROPOFOL 80 MG: 10 INJECTION, EMULSION INTRAVENOUS at 11:08

## 2024-08-19 NOTE — ANESTHESIA POSTPROCEDURE EVALUATION
Anesthesia Post Evaluation    Patient: Zonia Washington    Procedure(s) Performed: Procedure(s) (LRB):  INJECTION, STEROID, SPINE, CERVICAL, EPIDURAL  ///C7-T1 (N/A)    Final Anesthesia Type: general      Patient location during evaluation: OPS  Patient participation: Yes- Able to Participate  Level of consciousness: awake and oriented  Post-procedure vital signs: reviewed and stable  Pain management: adequate  Airway patency: patent    PONV status at discharge: No PONV  Anesthetic complications: no      Cardiovascular status: hemodynamically stable  Respiratory status: unassisted and spontaneous ventilation  Hydration status: euvolemic  Follow-up not needed.              Vitals Value Taken Time   /84 08/19/24 1219   Temp 36.7 08/19/24 1247   Pulse 69 08/19/24 1229   Resp 18 08/19/24 1247   SpO2 100 % 08/19/24 1229         No case tracking events are documented in the log.      Pain/Tete Score: Tete Score: 10 (8/19/2024 12:00 PM)

## 2024-08-19 NOTE — ANESTHESIA PREPROCEDURE EVALUATION
08/19/2024  Zonia Washington is a 53 y.o., female, who presents for the following:    Procedure: INJECTION, STEROID, SPINE, CERVICAL, EPIDURAL  ///C7-T1 (Spine Cervical) - CA C7-T1   Anesthesia type: Local MAC   Diagnosis:      Osteoarthritis of spine with radiculopathy, cervical region [M47.22]      Cervical spondylosis [M47.812]      Foraminal stenosis of cervical region [M48.02]   Pre-op diagnosis:      Osteoarthritis of spine with radiculopathy, cervical region [M47.22]      Cervical spondylosis [M47.812]      Foraminal stenosis of cervical region [M48.02]   Location: [facility] VIRTUAL PROCEDURAL ROOM / [facility] OR   Surgeons: Chloe Esquivel MD       Pre-op Assessment    I have reviewed the Patient Summary Reports.     I have reviewed the Nursing Notes. I have reviewed the NPO Status.   I have reviewed the Medications.     Review of Systems  Anesthesia Hx:  No problems with previous Anesthesia             Denies Family Hx of Anesthesia complications.    Denies Personal Hx of Anesthesia complications.                    Social:  Non-Smoker       Hepatic/GI:        Fatty Liver Dz          Musculoskeletal:  Arthritis               Neurological:             Osteoarthritis of spine with radiculopathy, cervical region                            Endocrine:  Diabetes, type 2               Physical Exam  General: Alert and Oriented    Airway:  Mallampati: II   Mouth Opening: Normal  TM Distance: Normal  Tongue: Normal  Neck ROM: Normal ROM    Dental:  Intact    Chest/Lungs:  Normal Respiratory Rate    Heart:  Rate: Normal  Rhythm: Regular Rhythm        Anesthesia Plan  Type of Anesthesia, risks & benefits discussed:    Anesthesia Type: Gen Natural Airway  Intra-op Monitoring Plan: Standard ASA Monitors  Post Op Pain Control Plan: IV/PO Opioids PRN  Induction:  IV  Airway Plan: Direct  Informed Consent: Informed  consent signed with the Patient and all parties understand the risks and agree with anesthesia plan.  All questions answered. Patient consented to blood products? No  ASA Score: 2  Day of Surgery Review of History & Physical: H&P Update referred to the surgeon/provider.  Anesthesia Plan Notes: Nasal cannula vs facemask supplemental oxygenation   For patients with JOSEFINA/obesity, may consider SuperNoval Nasal CPAP      Ready For Surgery From Anesthesia Perspective.     .

## 2024-08-19 NOTE — PLAN OF CARE
Problem: Diabetes Comorbidity  Goal: Blood Glucose Level Within Targeted Range  Outcome: Progressing     Problem: Pain Acute  Goal: Optimal Pain Control and Function  Outcome: Progressing     Problem: Fall Injury Risk  Goal: Absence of Fall and Fall-Related Injury  Outcome: Met     Problem: Infection  Goal: Absence of Infection Signs and Symptoms  Outcome: Met     Problem: Adult Inpatient Plan of Care  Goal: Plan of Care Review  Outcome: Met  Goal: Patient-Specific Goal (Individualized)  Outcome: Met  Goal: Absence of Hospital-Acquired Illness or Injury  Outcome: Met  Goal: Optimal Comfort and Wellbeing  Outcome: Met  Goal: Readiness for Transition of Care  Outcome: Met     Problem: Wound  Goal: Optimal Coping  Outcome: Met  Goal: Optimal Functional Ability  Outcome: Met  Goal: Absence of Infection Signs and Symptoms  Outcome: Met  Goal: Improved Oral Intake  Outcome: Met  Goal: Optimal Pain Control and Function  Outcome: Met  Goal: Skin Health and Integrity  Outcome: Met  Goal: Optimal Wound Healing  Outcome: Met

## 2024-08-19 NOTE — TRANSFER OF CARE
"Anesthesia Transfer of Care Note    Patient: Zonia Washington    Procedure(s) Performed: Procedure(s) (LRB):  INJECTION, STEROID, SPINE, CERVICAL, EPIDURAL  ///C7-T1 (N/A)    Patient location: OPS    Anesthesia Type: general    Transport from OR: Transported from OR on room air with adequate spontaneous ventilation    Post pain: adequate analgesia    Post assessment: no apparent anesthetic complications    Post vital signs: stable    Level of consciousness: awake, alert and oriented    Nausea/Vomiting: no nausea/vomiting    Complications: none    Transfer of care protocol was followed      Last vitals: Visit Vitals  BP (!) 142/85   Pulse 88   Temp 36.7 °C (98.1 °F)   Resp 18   Ht 5' 1" (1.549 m)   Wt 68.8 kg (151 lb 10.8 oz)   SpO2 99%   Breastfeeding No   BMI 28.66 kg/m²     "

## 2024-08-19 NOTE — DISCHARGE SUMMARY
[facility] Surgical - Periop Services  Discharge Note  Short Stay    Procedure(s) (LRB):  INJECTION, STEROID, SPINE, CERVICAL, EPIDURAL  ///C7-T1 (N/A)      OUTCOME: Patient tolerated treatment/procedure well without complication and is now ready for discharge.    DISPOSITION: Home or Self Care    FINAL DIAGNOSIS:  <principal problem not specified>    FOLLOWUP: In clinic    DISCHARGE INSTRUCTIONS:  No discharge procedures on file.     TIME SPENT ON DISCHARGE: 5 minutes

## 2024-08-20 ENCOUNTER — PATIENT MESSAGE (OUTPATIENT)
Dept: ADMINISTRATIVE | Facility: OTHER | Age: 54
End: 2024-08-20
Payer: COMMERCIAL

## 2024-08-20 VITALS
WEIGHT: 151.69 LBS | BODY MASS INDEX: 28.64 KG/M2 | HEIGHT: 61 IN | OXYGEN SATURATION: 100 % | DIASTOLIC BLOOD PRESSURE: 79 MMHG | TEMPERATURE: 98 F | SYSTOLIC BLOOD PRESSURE: 130 MMHG | RESPIRATION RATE: 16 BRPM | HEART RATE: 74 BPM

## 2024-08-23 ENCOUNTER — OFFICE VISIT (OUTPATIENT)
Dept: URGENT CARE | Facility: CLINIC | Age: 54
End: 2024-08-23
Payer: COMMERCIAL

## 2024-08-23 ENCOUNTER — PATIENT MESSAGE (OUTPATIENT)
Facility: CLINIC | Age: 54
End: 2024-08-23
Payer: COMMERCIAL

## 2024-08-23 ENCOUNTER — TELEPHONE (OUTPATIENT)
Facility: CLINIC | Age: 54
End: 2024-08-23
Payer: COMMERCIAL

## 2024-08-23 VITALS
DIASTOLIC BLOOD PRESSURE: 85 MMHG | OXYGEN SATURATION: 99 % | SYSTOLIC BLOOD PRESSURE: 137 MMHG | BODY MASS INDEX: 28.32 KG/M2 | WEIGHT: 150 LBS | HEIGHT: 61 IN | TEMPERATURE: 98 F | HEART RATE: 70 BPM | RESPIRATION RATE: 18 BRPM

## 2024-08-23 DIAGNOSIS — L25.9 CONTACT DERMATITIS, UNSPECIFIED CONTACT DERMATITIS TYPE, UNSPECIFIED TRIGGER: Primary | ICD-10-CM

## 2024-08-23 RX ORDER — HYDROXYZINE PAMOATE 25 MG/1
25 CAPSULE ORAL EVERY 8 HOURS PRN
Qty: 15 CAPSULE | Refills: 0 | Status: SHIPPED | OUTPATIENT
Start: 2024-08-23 | End: 2024-08-28

## 2024-08-23 RX ORDER — METHYLPREDNISOLONE 4 MG/1
TABLET ORAL
Qty: 21 EACH | Refills: 0 | Status: SHIPPED | OUTPATIENT
Start: 2024-08-23 | End: 2024-09-13

## 2024-08-23 RX ORDER — PREDNISONE 20 MG/1
20 TABLET ORAL 2 TIMES DAILY
Qty: 10 TABLET | Refills: 0 | Status: SHIPPED | OUTPATIENT
Start: 2024-08-23 | End: 2024-08-28

## 2024-08-23 RX ORDER — TRIAMCINOLONE ACETONIDE 1 MG/G
CREAM TOPICAL 2 TIMES DAILY
Qty: 80 G | Refills: 0 | Status: SHIPPED | OUTPATIENT
Start: 2024-08-23 | End: 2024-08-30

## 2024-08-23 NOTE — PROGRESS NOTES
"Subjective:      Patient ID: Zonia Washington is a 53 y.o. female.    Vitals:  height is 5' 1" (1.549 m) and weight is 68 kg (150 lb). Her oral temperature is 97.9 °F (36.6 °C). Her blood pressure is 137/85 and her pulse is 70. Her respiration is 18 and oxygen saturation is 99%.     Chief Complaint: Rash     Patient is a 53 y.o. female who presents to urgent care with complaints of painful itchy rash on neck and back since this morning. Patient states she is worried it is related to an epidural injection she received on Monday. Alleviating factors include Benadryl with no relief. Patient denies drainage or shortness of breath.   States noticing this rash this morning with a large amount of itching and irritation states the areas on her right lower neck on the posterior and anterior aspects on the left and right also.  Denies any other areas denies any known contacts with any known allergens.    ROS   Objective:     Physical Exam   Constitutional: She is oriented to person, place, and time. She appears well-developed. She is cooperative.  Non-toxic appearance. She does not appear ill. No distress.   HENT:   Head: Normocephalic and atraumatic.   Ears:   Right Ear: Hearing, tympanic membrane, external ear and ear canal normal.   Left Ear: Hearing, tympanic membrane, external ear and ear canal normal.   Nose: Nose normal. No mucosal edema, rhinorrhea or nasal deformity. No epistaxis. Right sinus exhibits no maxillary sinus tenderness and no frontal sinus tenderness. Left sinus exhibits no maxillary sinus tenderness and no frontal sinus tenderness.   Mouth/Throat: Uvula is midline, oropharynx is clear and moist and mucous membranes are normal. No trismus in the jaw. Normal dentition. No uvula swelling. No oropharyngeal exudate, posterior oropharyngeal edema or posterior oropharyngeal erythema.   Eyes: Conjunctivae and lids are normal. No scleral icterus.   Neck: Trachea normal and phonation normal. Neck supple. No " edema present. No erythema present. No neck rigidity present.   Cardiovascular: Normal rate, regular rhythm, normal heart sounds and normal pulses.   Pulmonary/Chest: Effort normal and breath sounds normal. No respiratory distress. She has no decreased breath sounds. She has no rhonchi.   Abdominal: Normal appearance.   Musculoskeletal: Normal range of motion.         General: No deformity. Normal range of motion.   Neurological: She is alert and oriented to person, place, and time. She exhibits normal muscle tone. Coordination normal.   Skin: Skin is warm, dry, intact, not diaphoretic, not pale and rash.   Psychiatric: Her speech is normal and behavior is normal. Judgment and thought content normal.   Nursing note and vitals reviewed.      Assessment:     1. Contact dermatitis, unspecified contact dermatitis type, unspecified trigger        Plan:   Patient did receive a cervical injection of 10 mg of Decadron on Monday , we will hold off on IM injection of steroid in clinic and we will begin taking oral prednisone today along with topical steroid cream and hydroxyzine added for itching.    Did discuss return to clinic if symptoms do not improve or if any further questions to call.    Contact dermatitis, unspecified contact dermatitis type, unspecified trigger    Other orders  -     predniSONE (DELTASONE) 20 MG tablet; Take 1 tablet (20 mg total) by mouth 2 (two) times daily. for 5 days  Dispense: 10 tablet; Refill: 0  -     triamcinolone acetonide 0.1% (KENALOG) 0.1 % cream; Apply topically 2 (two) times daily. for 7 days  Dispense: 80 g; Refill: 0  -     hydrOXYzine pamoate (VISTARIL) 25 MG Cap; Take 1 capsule (25 mg total) by mouth every 8 (eight) hours as needed (as needed for itching).  Dispense: 15 capsule; Refill: 0

## 2024-08-23 NOTE — PATIENT INSTRUCTIONS
Begin taking oral steroids today with food and take as directed until completion  Apply steroid ointment to areas of itching and irritation do not apply to any facial areas if these itchy areas do become prevalent on the face use 1% hydrocortisone   Use Vistaril as needed for itching at night caution with this medication can cause some sedation effects   Monitor for any worsening symptoms in his these areas worsen or do not improve in the next 48 hours please have this re-evaluated   Call with any questions or concerns.

## 2024-08-27 ENCOUNTER — LAB VISIT (OUTPATIENT)
Dept: LAB | Facility: HOSPITAL | Age: 54
End: 2024-08-27
Attending: STUDENT IN AN ORGANIZED HEALTH CARE EDUCATION/TRAINING PROGRAM
Payer: COMMERCIAL

## 2024-08-27 DIAGNOSIS — R91.8 PULMONARY NODULES: ICD-10-CM

## 2024-08-27 LAB — RHEUMATOID FACT SERPL-ACNC: <13 IU/ML

## 2024-08-27 PROCEDURE — 86431 RHEUMATOID FACTOR QUANT: CPT | Mod: 59

## 2024-08-27 PROCEDURE — 86039 ANTINUCLEAR ANTIBODIES (ANA): CPT

## 2024-08-27 PROCEDURE — 86200 CCP ANTIBODY: CPT

## 2024-08-27 PROCEDURE — 86431 RHEUMATOID FACTOR QUANT: CPT

## 2024-08-27 PROCEDURE — 86235 NUCLEAR ANTIGEN ANTIBODY: CPT

## 2024-08-27 PROCEDURE — 36415 COLL VENOUS BLD VENIPUNCTURE: CPT

## 2024-08-29 LAB
ANA SER QL HEP2 SUBST: NORMAL
CYCLIC CITRULLINATED PEPTIDE (CCP) (OHS): 1.1 U/ML
ENA JO1 IGG SER-ACNC: <0.2 U
ENA RNP IGG SER IA-ACNC: <0.2 U
ENA SCL70 IGG SER IA-ACNC: <0.2 U
ENA SM IGG SER-ACNC: <0.2 U
ENA SS-A IGG SER-ACNC: <0.2 U
ENA SS-B IGG SER-ACNC: <0.2 U
RHEUMATOID FACTOR IGA QUANTITATIVE (OLG): 4.1 IU/ML
RHEUMATOID FACTOR IGM QUANTITATIVE (OLG): 2.7 IU/ML

## 2024-08-30 ENCOUNTER — TELEPHONE (OUTPATIENT)
Dept: FAMILY MEDICINE | Facility: CLINIC | Age: 54
End: 2024-08-30
Payer: COMMERCIAL

## 2024-09-03 ENCOUNTER — OFFICE VISIT (OUTPATIENT)
Dept: FAMILY MEDICINE | Facility: CLINIC | Age: 54
End: 2024-09-03
Payer: COMMERCIAL

## 2024-09-03 VITALS
HEIGHT: 61 IN | RESPIRATION RATE: 16 BRPM | HEART RATE: 87 BPM | BODY MASS INDEX: 29.83 KG/M2 | DIASTOLIC BLOOD PRESSURE: 77 MMHG | SYSTOLIC BLOOD PRESSURE: 112 MMHG | OXYGEN SATURATION: 98 % | WEIGHT: 158 LBS

## 2024-09-03 DIAGNOSIS — E11.69 HYPERLIPIDEMIA ASSOCIATED WITH TYPE 2 DIABETES MELLITUS: ICD-10-CM

## 2024-09-03 DIAGNOSIS — E11.9 CONTROLLED TYPE 2 DIABETES MELLITUS WITHOUT COMPLICATION, WITHOUT LONG-TERM CURRENT USE OF INSULIN: ICD-10-CM

## 2024-09-03 DIAGNOSIS — E78.5 HYPERLIPIDEMIA ASSOCIATED WITH TYPE 2 DIABETES MELLITUS: ICD-10-CM

## 2024-09-03 DIAGNOSIS — R42 DIZZINESS: Primary | ICD-10-CM

## 2024-09-03 PROCEDURE — 3078F DIAST BP <80 MM HG: CPT | Mod: CPTII,,,

## 2024-09-03 PROCEDURE — G2211 COMPLEX E/M VISIT ADD ON: HCPCS | Mod: ,,,

## 2024-09-03 PROCEDURE — 3074F SYST BP LT 130 MM HG: CPT | Mod: CPTII,,,

## 2024-09-03 PROCEDURE — 3008F BODY MASS INDEX DOCD: CPT | Mod: CPTII,,,

## 2024-09-03 PROCEDURE — 99213 OFFICE O/P EST LOW 20 MIN: CPT | Mod: ,,,

## 2024-09-03 PROCEDURE — 1159F MED LIST DOCD IN RCRD: CPT | Mod: CPTII,,,

## 2024-09-03 PROCEDURE — 3051F HG A1C>EQUAL 7.0%<8.0%: CPT | Mod: CPTII,,,

## 2024-09-03 PROCEDURE — 1160F RVW MEDS BY RX/DR IN RCRD: CPT | Mod: CPTII,,,

## 2024-09-03 NOTE — PROGRESS NOTES
Patient ID: [id number]     Chief Complaint: Dizziness and ER Follow up    HPI:     Zonia Washington is a 53 y.o. female here today for a follow up. Reports presenting to ED for symptom of headache, dizziness, nausea and vomiting. She denies upper respiratory symptoms. She was evaluated for COVID which was positive. She presents today with resolution of symptoms since ED discharge. She denies upper respiratory symptoms, dizziness, headache, chest pain, n/v.      Patient is a diabetic, A1C 7.0%. She does not monitor her blood glucose at home. Patient declines medication treatment stating she is managing her diabetes with diet and exercise. DM foot exam is up to date. Podiatrist, Dr. Saenz. DM eye exam is up to date.  HLD is controlled with current Rx. She reports daily compliance with cholesterol medication. She denies abd cramping or muscle pain.      Patient reports a recent autoimmune disease workup ordered by her Pulmonologist. Reviewed results with the patient. She is scheduled for a CT chest for evaluation of pulmonary nodules. She denies chest pain, shortness of breath. Keep follow up appointment with Pulmonologist.  Patient denies the need for medication refill and she has no other concerns to discus.    Past Medical History:   Diagnosis Date    Mixed hyperlipidemia 10/31/2023    Obesity, unspecified     Osteoarthritis of spine with radiculopathy, cervical region     Psoriasis 05/29/2022    Type 2 diabetes mellitus without complications         Past Surgical History:   Procedure Laterality Date    CHOLECYSTECTOMY      DILATION AND CURETTAGE OF UTERUS      EPIDURAL STEROID INJECTION INTO CERVICAL SPINE N/A 8/19/2024    Procedure: INJECTION, STEROID, SPINE, CERVICAL, EPIDURAL  ///C7-T1;  Surgeon: Chloe Esquivel MD;  Location: [facility];  Service: Pain Management;  Laterality: N/A;  CA C7-T1    HERNIA REPAIR      HERNIA REPAIR      HYSTERECTOMY      MYOMECTOMY          Social History     Tobacco Use     Smoking status: Never    Smokeless tobacco: Never   Substance and Sexual Activity    Alcohol use: Yes     Comment: Occasionally    Drug use: Never    Sexual activity: Not Currently     Partners: Male     Birth control/protection: None        Current Outpatient Medications   Medication Instructions    aspirin (ECOTRIN) 81 mg, Oral    cetirizine (ZYRTEC) 10 mg, Oral    cholecalciferol, vitamin D3, (VITAMIN D3) 10 mcg (400 unit) Tab No dose, route, or frequency recorded.    diclofenac sodium (VOLTAREN) 2 g, Topical (Top), 4 times daily PRN    Lactobacillus acidophilus (ACIDOPHILUS ORAL) Oral, Daily    methylPREDNISolone (MEDROL DOSEPACK) 4 mg tablet use as directed    multivit with min-folic acid (ONE-A-DAY WOMEN'S 50 PLUS) 0.4 mg Tab No dose, route, or frequency recorded.    rosuvastatin (CRESTOR) 5 mg, Oral, Nightly    triamcinolone acetonide 0.1% (KENALOG) 0.1 % cream Topical (Top), 2 times daily       Review of patient's allergies indicates:   Allergen Reactions    Latex Hives, Itching and Rash        Patient Care Team:  Zara Fairbanks MD as PCP - General (Family Medicine)     Subjective:     Review of Systems   Constitutional: Negative.    HENT: Negative.     Eyes:  Negative for visual disturbance.   Respiratory:  Negative for cough, chest tightness and shortness of breath.    Cardiovascular:  Negative for chest pain, palpitations and leg swelling.   Gastrointestinal:  Negative for abdominal pain, constipation, diarrhea, nausea and vomiting.   Endocrine: Negative for cold intolerance, heat intolerance, polydipsia, polyphagia and polyuria.   Genitourinary: Negative.    Musculoskeletal:  Negative for arthralgias, gait problem, joint swelling and neck pain.   Skin: Negative.    Neurological:  Negative for dizziness, syncope, weakness and headaches.   Psychiatric/Behavioral:  Negative for self-injury, sleep disturbance and suicidal ideas. The patient is not nervous/anxious.        12 point review of systems  "conducted, negative except as stated in the history of present illness. See HPI for details.    Objective:     Visit Vitals  /77 (BP Location: Right arm, Patient Position: Sitting, BP Method: Medium (Automatic))   Pulse 87   Resp 16   Ht 5' 1" (1.549 m)   Wt 71.7 kg (158 lb)   SpO2 98%   BMI 29.85 kg/m²       Physical Exam  Vitals reviewed.   Constitutional:       General: She is not in acute distress.  HENT:      Head: Normocephalic.      Right Ear: Tympanic membrane, ear canal and external ear normal.      Left Ear: Tympanic membrane, ear canal and external ear normal.      Nose: Nose normal.      Mouth/Throat:      Mouth: Mucous membranes are moist.      Pharynx: Oropharynx is clear.   Eyes:      Conjunctiva/sclera: Conjunctivae normal.      Pupils: Pupils are equal, round, and reactive to light.   Cardiovascular:      Rate and Rhythm: Normal rate and regular rhythm.      Pulses: Normal pulses.      Heart sounds: Normal heart sounds.   Pulmonary:      Effort: Pulmonary effort is normal.      Breath sounds: Normal breath sounds.   Abdominal:      General: Bowel sounds are normal.      Palpations: Abdomen is soft.      Tenderness: There is no abdominal tenderness.   Musculoskeletal:         General: Normal range of motion.      Cervical back: Normal range of motion and neck supple.   Skin:     General: Skin is warm and dry.      Capillary Refill: Capillary refill takes less than 2 seconds.   Neurological:      General: No focal deficit present.      Mental Status: She is alert and oriented to person, place, and time.   Psychiatric:         Mood and Affect: Mood normal.         Behavior: Behavior normal.         Thought Content: Thought content normal.         Judgment: Judgment normal.       Assessment:       ICD-10-CM ICD-9-CM   1. Dizziness  R42 780.4   2. Hyperlipidemia associated with type 2 diabetes mellitus  E11.69 250.80    E78.5 272.4   3. Controlled type 2 diabetes mellitus without complication, " without long-term current use of insulin  E11.9 250.00        Plan:     1. Dizziness  Assessment & Plan:  Resolved.  Go to the emergency department if symptoms of chest pain/tightness, shortness of breath, increased pain, fall, change in level of consciousness, fever/chills, temp >100.4 or any acute illness.         2. Hyperlipidemia associated with type 2 diabetes mellitus  Assessment & Plan:  Stable. Continue crestor as prescribed nightly.  Notify Provider of medications side effects, abdominal pain, muscle aches.  Continue to follow a low cholesterol, low fat diet. Avoid fried foods and high saturated fats.  Increase fiber intake. Foods high in soluble fiber, such as oats, beans, lentils, fruits, and vegetables, can help lower LDL cholesterol levels.   Exercise/walk 5 times per week for 30 minutes a day. Regular physical activity can help raise HDL (high-density lipoprotein) cholesterol and lower LDL cholesterol.             3. Controlled type 2 diabetes mellitus without complication, without long-term current use of insulin  Assessment & Plan:  Stable with diet and exercise.   Stressed importance of following ADA diet and exercise 5 times a week, 30 minute sessions.   Stressed importance of daily foot exam.  Stressed importance of annual eye exam (UTD).             Keep future appointments as scheduled.   Schedule annual wellness  In addition to their scheduled follow up, the patient has also been instructed to follow up on as needed basis.       Future Appointments   Date Time Provider Department Center   9/9/2024  4:45 PM [facility] CT1 [facility]   9/10/2024  3:15 PM Chloe Esquivel MD [facility] PAINMD [facility]   12/11/2024  3:45 PM Mahnaz Lomeli FNP [facility] FAMMED [place]    8/18/2025  9:40 AM Mauri Diaz MD [facility]

## 2024-09-03 NOTE — ASSESSMENT & PLAN NOTE
Stable with diet and exercise.   Stressed importance of following ADA diet and exercise 5 times a week, 30 minute sessions.   Stressed importance of daily foot exam.  Stressed importance of annual eye exam (UTD).

## 2024-09-03 NOTE — ASSESSMENT & PLAN NOTE
Stable. Continue crestor as prescribed nightly.  Notify Provider of medications side effects, abdominal pain, muscle aches.  Continue to follow a low cholesterol, low fat diet. Avoid fried foods and high saturated fats.  Increase fiber intake. Foods high in soluble fiber, such as oats, beans, lentils, fruits, and vegetables, can help lower LDL cholesterol levels.   Exercise/walk 5 times per week for 30 minutes a day. Regular physical activity can help raise HDL (high-density lipoprotein) cholesterol and lower LDL cholesterol.

## 2024-09-03 NOTE — ASSESSMENT & PLAN NOTE
Resolved.  Go to the emergency department if symptoms of chest pain/tightness, shortness of breath, increased pain, fall, change in level of consciousness, fever/chills, temp >100.4 or any acute illness.

## 2024-09-25 ENCOUNTER — OFFICE VISIT (OUTPATIENT)
Facility: CLINIC | Age: 54
End: 2024-09-25
Payer: COMMERCIAL

## 2024-09-25 VITALS
BODY MASS INDEX: 29.84 KG/M2 | TEMPERATURE: 98 F | SYSTOLIC BLOOD PRESSURE: 118 MMHG | WEIGHT: 158.06 LBS | DIASTOLIC BLOOD PRESSURE: 71 MMHG | HEIGHT: 61 IN | HEART RATE: 86 BPM

## 2024-09-25 DIAGNOSIS — G89.29 CHRONIC NECK PAIN: Primary | ICD-10-CM

## 2024-09-25 DIAGNOSIS — M54.2 CHRONIC NECK PAIN: Primary | ICD-10-CM

## 2024-09-25 DIAGNOSIS — M50.30 DDD (DEGENERATIVE DISC DISEASE), CERVICAL: ICD-10-CM

## 2024-09-25 DIAGNOSIS — M54.12 CERVICAL RADICULITIS: ICD-10-CM

## 2024-09-25 PROCEDURE — 1159F MED LIST DOCD IN RCRD: CPT | Mod: CPTII,,, | Performed by: NURSE PRACTITIONER

## 2024-09-25 PROCEDURE — 3051F HG A1C>EQUAL 7.0%<8.0%: CPT | Mod: CPTII,,, | Performed by: NURSE PRACTITIONER

## 2024-09-25 PROCEDURE — 1160F RVW MEDS BY RX/DR IN RCRD: CPT | Mod: CPTII,,, | Performed by: NURSE PRACTITIONER

## 2024-09-25 PROCEDURE — 3074F SYST BP LT 130 MM HG: CPT | Mod: CPTII,,, | Performed by: NURSE PRACTITIONER

## 2024-09-25 PROCEDURE — 3008F BODY MASS INDEX DOCD: CPT | Mod: CPTII,,, | Performed by: NURSE PRACTITIONER

## 2024-09-25 PROCEDURE — 3078F DIAST BP <80 MM HG: CPT | Mod: CPTII,,, | Performed by: NURSE PRACTITIONER

## 2024-09-25 PROCEDURE — 99214 OFFICE O/P EST MOD 30 MIN: CPT | Mod: ,,, | Performed by: NURSE PRACTITIONER

## 2024-09-25 RX ORDER — FLUCONAZOLE 200 MG/1
200 TABLET ORAL
COMMUNITY
Start: 2024-09-12

## 2024-09-25 RX ORDER — ACITRETIN 10 MG/1
20 CAPSULE ORAL DAILY
COMMUNITY
Start: 2024-09-17

## 2024-09-25 NOTE — PROGRESS NOTES
"  [facility]    Subjective:     Chief Complaint: Follow-up (F/u procedure CA C7-T1 pt states she received great relief, pain level has decreased, 8/19/24 C/O pain level 3/10)    Referred by: No ref. provider found     History of Present Illness: Zonia Washington is a 53 y.o. female presents today as a postop follow-up for cervical radiculopathy after completing a cervical epidural steroid injection interlaminar C7-T1 on 08/19/2024.  Reports excellent pain relief.  Her pain score today is 3/10 on the NRS.  Patient overall is extremely satisfied with her pain relief and states that she no longer has that intense pain when she raises her right arm or with driving and looking side-to-side especially when turning her neck to the right.  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  Overall she reports about 80% reduction of pain in his very satisfied with this she is not taking anything for pain in his not interested in in starting a pain medication at this time.  In the past she did try gabapentin that was ineffective.  She has not tried an antidepressant and states she has no thoughts of homicide or suicide.  She would like to schedule a three-month follow-up to evaluate her cervicalgia with radiculopathy.    Visit Vitals  /71 (BP Location: Right arm, Patient Position: Sitting, BP Method: Large (Automatic))   Pulse 86   Temp 97.8 °F (36.6 °C) (Oral)   Ht 5' 1" (1.549 m)   Wt 71.7 kg (158 lb 1.1 oz)   BMI 29.87 kg/m²      Vitals:    09/25/24 1548   PainSc:   3             Interventional Pain History  08/19/2024: CA C7-T1    ROS: Neck pain     MRI 2024:  FINDINGS:  CORD: Normal size and signal.  No syrinx.  Cervicomedullary junction is normal.     ALIGNMENT: Normal.  Lateral masses of C1 and C2 are congruent.     BONES: Vertebral body heights are maintained.  No aggressive bone marrow signal.     PARASPINAL AREA: Normal.     CERVICAL " DISC LEVELS:     C2-C3: Unremarkable.     C3-C4: Broad-based disc osteophyte more pronounced on the right causing mild central canal stenosis and moderate right foraminal stenosis.  The left foramina is patent.     C4-C5: Broad-based disc osteophyte causing mild central canal stenosis and mild bilateral foraminal stenosis.     C5-C6: Unremarkable.     C6-C7: Shallow broad based disc osteophyte causing mild central canal stenosis.  No significant foraminal stenosis.     C7-T1: Unremarkable.     Impression:     Multilevel cervical spondylosis as detailed on a level by level basis.        Electronically signed by:Armond Perales  Date:                                            06/11/2024  Time:                                           15:48    Objective:        Physical Exam  General: Well developed; normal weight; A&O x 3; No anxiety/depression; NAD.  No thoughts of homicide or suicide Mental Status: Oriented to person, palce and time. Displays appropriate mood & affect.  Head: Norm cephalic and atraumatic  Neck:  Right-sided + cervical paraspinal banding.  Full range of motion with lateral turning and cervical flexion +extension.equal hand  bilaterally  Eyes: normal conjunctiva, normal lids, normal pupils  ENT and mouth: normal external ear, nose, and no lesions noted on the lips.  Respiratory: Symmetrical, Unlabored. No dyspnea  CV: normal rhythm and rate. No peripheral edema.   Abdomen: Non-distended     Extremities:  Gen: No cyanosis or tenderness to palpation bilateral upper and lower extremities  Skin: Warm, pink, dry, no rashes, no lesions on the lumbar spine  Strength: 5/5 motor strength bilateral upper and lower extremities  ROM: Full ROM in bilateral knees and ankles without pain or instability.     Neuro:  Gait: no altalgic lean, normal toe and heel raise. Independent ambulator.  DTR's: 2+ in bilateral patella  Sensory: Intact to light touch bilateral  upper and lower extremities     Spine: Normal  lordosis. mild scoliosis  L-spine ROM: Full ROM to flexion, extension, bilateral rotation,   Straight Leg Raise: neg bilaterally  SI Joint: No tenderness to palpation bilaterally.      Assessment:     Zonia Washington is a 53 y.o. female presents today as a postop follow-up for cervical radiculopathy after completing a cervical epidural steroid injection interlaminar C7-T1 on 08/19/2024.  Reports excellent pain relief.  Her pain score today is 3/10 on the NRS.  Patient overall is extremely satisfied with her pain relief and states that she no longer has that intense pain when she raises her right arm or with driving and looking side-to-side especially when turning her neck to the right.  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  Overall she reports about 80% reduction of pain in his very satisfied with this she is not taking anything for pain in his not interested in in starting a pain medication at this time.  In the past she did try gabapentin that was ineffective.  She has not tried an antidepressant and states she has no thoughts of homicide or suicide.  She would like to schedule a three-month follow-up to evaluate her cervicalgia with radiculopathy.    Plan of care:  Patient to follow-up in 3 months to evaluate cervicalgia with radiculopathy.    Future considerations for possible Cymbalta or amitriptyline if patient was interested.  Encounter Diagnoses   Name Primary?    Chronic neck pain Yes    Cervical radiculitis     DDD (degenerative disc disease), cervical          Plan:       Zonia was seen today for follow-up.    Diagnoses and all orders for this visit:    Chronic neck pain    Cervical radiculitis    DDD (degenerative disc disease), cervical           Past Medical History:   Diagnosis Date    Mixed hyperlipidemia 10/31/2023    Obesity, unspecified     Osteoarthritis of spine with radiculopathy, cervical region     Psoriasis 05/29/2022     Type 2 diabetes mellitus without complications        Past Surgical History:   Procedure Laterality Date    CHOLECYSTECTOMY      DILATION AND CURETTAGE OF UTERUS      EPIDURAL STEROID INJECTION INTO CERVICAL SPINE N/A 8/19/2024    Procedure: INJECTION, STEROID, SPINE, CERVICAL, EPIDURAL  ///C7-T1;  Surgeon: Chloe Esquivel MD;  Location: [facility];  Service: Pain Management;  Laterality: N/A;  CA C7-T1    HERNIA REPAIR      HERNIA REPAIR      HYSTERECTOMY      MYOMECTOMY         Family History   Problem Relation Name Age of Onset    Diabetes Mother Nieceviraj Washington     Cancer Mother Gaileceviraj Washington     Cancer Father Carlos Murphy        Social History     Socioeconomic History    Marital status:    Tobacco Use    Smoking status: Never    Smokeless tobacco: Never   Substance and Sexual Activity    Alcohol use: Yes     Comment: Occasionally    Drug use: Never    Sexual activity: Not Currently     Partners: Male     Birth control/protection: None   Social History Narrative    ** Merged History Encounter **          Social Determinants of Health     Financial Resource Strain: Low Risk  (11/29/2023)    Overall Financial Resource Strain (CARDIA)     Difficulty of Paying Living Expenses: Not hard at all   Food Insecurity: No Food Insecurity (11/29/2023)    Hunger Vital Sign     Worried About Running Out of Food in the Last Year: Never true     Ran Out of Food in the Last Year: Never true   Transportation Needs: No Transportation Needs (11/29/2023)    PRAPARE - Transportation     Lack of Transportation (Medical): No     Lack of Transportation (Non-Medical): No   Physical Activity: Insufficiently Active (11/29/2023)    Exercise Vital Sign     Days of Exercise per Week: 5 days     Minutes of Exercise per Session: 10 min   Stress: No Stress Concern Present (11/29/2023)    [employer] - Occupational Stress Questionnaire     Feeling of Stress : Not at all   Housing Stability: Low Risk  (11/29/2023)     Housing Stability Vital Sign     Unable to Pay for Housing in the Last Year: No     Number of Places Lived in the Last Year: 1     Unstable Housing in the Last Year: No       Current Outpatient Medications   Medication Sig Dispense Refill    acitretin (SORIATANE) 10 MG capsule Take 20 mg by mouth once daily.      aspirin (ECOTRIN) 81 MG EC tablet Take 81 mg by mouth.      cetirizine (ZYRTEC) 10 MG tablet Take 10 mg by mouth.      cholecalciferol, vitamin D3, (VITAMIN D3) 10 mcg (400 unit) Tab       diclofenac sodium (VOLTAREN) 1 % Gel Apply 2 g topically 4 (four) times daily as needed (pain/inflammation). 100 g 1    fluconazole (DIFLUCAN) 200 MG Tab Take 200 mg by mouth every 7 days.      multivit with min-folic acid (ONE-A-DAY WOMEN'S 50 PLUS) 0.4 mg Tab       rosuvastatin (CRESTOR) 5 MG tablet Take 1 tablet (5 mg total) by mouth nightly. 90 tablet 3    Lactobacillus acidophilus (ACIDOPHILUS ORAL) Take by mouth Daily.      triamcinolone acetonide 0.1% (KENALOG) 0.1 % cream Apply topically 2 (two) times daily. for 7 days 80 g 0     No current facility-administered medications for this visit.       Review of patient's allergies indicates:   Allergen Reactions    Latex Hives, Itching and Rash

## 2024-10-18 ENCOUNTER — OFFICE VISIT (OUTPATIENT)
Dept: FAMILY MEDICINE | Facility: CLINIC | Age: 54
End: 2024-10-18
Payer: COMMERCIAL

## 2024-10-18 VITALS
OXYGEN SATURATION: 97 % | HEIGHT: 61 IN | HEART RATE: 77 BPM | DIASTOLIC BLOOD PRESSURE: 69 MMHG | WEIGHT: 156 LBS | BODY MASS INDEX: 29.45 KG/M2 | SYSTOLIC BLOOD PRESSURE: 104 MMHG | RESPIRATION RATE: 16 BRPM

## 2024-10-18 DIAGNOSIS — E78.5 HYPERLIPIDEMIA ASSOCIATED WITH TYPE 2 DIABETES MELLITUS: ICD-10-CM

## 2024-10-18 DIAGNOSIS — E11.69 HYPERLIPIDEMIA ASSOCIATED WITH TYPE 2 DIABETES MELLITUS: ICD-10-CM

## 2024-10-18 DIAGNOSIS — E11.9 CONTROLLED TYPE 2 DIABETES MELLITUS WITHOUT COMPLICATION, WITHOUT LONG-TERM CURRENT USE OF INSULIN: Primary | ICD-10-CM

## 2024-10-18 NOTE — PROGRESS NOTES
Patient ID: [id number]     Chief Complaint: Diabetes, Hypertension, Cholesterol    HPI:     Zonia Washington is a 54 y.o. female here today for a follow up.   Patient is diabetic and refuses to take metformin. Admits to not monitoring her blood glucose at home. Current HgA1C 7.0.  Educated on risk of not managing diabetes.  Diabetic eye and foot exam is up-to-date.  Patient denies any for diabetic supplies.  Cholesterol well-controlled with current Rx of Crestor.  Patient denies medication side effects.  Denies any for medication refill today.      Past Medical History:   Diagnosis Date    Mixed hyperlipidemia 10/31/2023    Obesity, unspecified     Osteoarthritis of spine with radiculopathy, cervical region     Psoriasis 05/29/2022    Type 2 diabetes mellitus without complications         Past Surgical History:   Procedure Laterality Date    CHOLECYSTECTOMY      DILATION AND CURETTAGE OF UTERUS      EPIDURAL STEROID INJECTION INTO CERVICAL SPINE N/A 8/19/2024    Procedure: INJECTION, STEROID, SPINE, CERVICAL, EPIDURAL  ///C7-T1;  Surgeon: Chloe Esquivel MD;  Location: [facility];  Service: Pain Management;  Laterality: N/A;  CA C7-T1    HERNIA REPAIR      HERNIA REPAIR      HYSTERECTOMY      MYOMECTOMY          Social History     Tobacco Use    Smoking status: Never    Smokeless tobacco: Never   Substance and Sexual Activity    Alcohol use: Yes     Comment: Occasionally    Drug use: Never    Sexual activity: Not Currently     Partners: Male     Birth control/protection: None        Current Outpatient Medications   Medication Instructions    acitretin (SORIATANE) 20 mg, Daily    aspirin (ECOTRIN) 81 mg    cetirizine (ZYRTEC) 10 mg    cholecalciferol, vitamin D3, (VITAMIN D3) 10 mcg (400 unit) Tab No dose, route, or frequency recorded.    diclofenac sodium (VOLTAREN) 2 g, Topical (Top), 4 times daily PRN    empagliflozin (JARDIANCE) 10 mg, Oral, Daily    multivit with min-folic acid (ONE-A-DAY WOMEN'S 50 PLUS)  "0.4 mg Tab No dose, route, or frequency recorded.    rosuvastatin (CRESTOR) 5 mg, Oral, Nightly    triamcinolone acetonide 0.1% (KENALOG) 0.1 % cream Topical (Top), 2 times daily       Review of patient's allergies indicates:   Allergen Reactions    Latex Hives, Itching and Rash        Patient Care Team:  Zara Fairbanks MD as PCP - General (Family Medicine)     Subjective:     Review of Systems    Constitutional: Denies Change in appetite. Denies Chills. Denies Fever. Denies Night sweats.  Eye: Denies Blurred vision. Denies Discharge. Denies Eye pain.  ENT: Denies Decreased hearing. Denies Sore throat. Denies Swollen glands.  Respiratory: Denies Cough. Denies Shortness of breath. Denies Shortness of breath with exertion. Denies Wheezing.  Cardiovascular: Denies Chest pain at rest. Denies Chest pain with exertion. Denies Irregular heartbeat. Denies Palpitations.  Gastrointestinal: Denies Abdominal pain. Denies Diarrhea. Denies Nausea. Denies Vomiting. Denies Hematemesis or Hematochezia.  Genitourinary: Denies Dysuria. Denies Urinary frequency. Denies Urinary urgency. Denies Blood in urine.  Endocrine: Denies Cold intolerance. Denies Excessive thirst. Denies Heat intolerance. Denies Weight loss. Denies Weight gain.  Musculoskeletal: Denies Painful joints. Denies Weakness.  Integumentary: Denies Rash. Denies Itching. Denies Dry skin.  Neurologic: Denies Dizziness. Denies Fainting. Denies Headache.  Psychiatric: As per HPI. Denies Depression. Denies Anxiety. Denies Suicidal/Homicidal ideations.    All Other ROS: Negative except as stated in HPI.    12 point review of systems conducted, negative except as stated in the history of present illness. See HPI for details.    Objective:     Visit Vitals  /69 (BP Location: Right arm, Patient Position: Sitting)   Pulse 77   Resp 16   Ht 5' 1" (1.549 m)   Wt 70.8 kg (156 lb)   SpO2 97%   BMI 29.48 kg/m²       Physical Exam     General: Alert and oriented, No acute " distress.   Appearance: Overweight.   Eye: Pupils are equal, round and reactive to light, Extraocular movements are intact, Normal conjunctiva.   HENT: Normocephalic, Tympanic membranes are clear, Normal hearing, Oral mucosa is moist, No pharyngeal erythema.   Throat: Pharynx ( Not edematous, No exudate ).   Neck: Supple, Non-tender, No carotid bruit, No lymphadenopathy, No thyromegaly.   Respiratory: Lungs are clear to auscultation, Respirations are non-labored, Breath sounds are equal, Symmetrical chest wall expansion, No chest wall tenderness.   Cardiovascular: Normal rate, Regular rhythm, No murmur, Good pulses equal in all extremities, No edema.   Gastrointestinal: Soft, Non-tender, Non-distended, Normal bowel sounds, No organomegaly.   Genitourinary: No costovertebral angle tenderness.   Musculoskeletal: Normal range of motion, No tenderness, Normal gait.   Neurologic: No focal deficits, Cranial Nerves II-XII are grossly intact.   Psychiatric: Cooperative, Appropriate mood & affect, Normal judgment, Non-suicidal.   Mood and affect: Calm.   Behavior: Relaxed.       Assessment:       ICD-10-CM ICD-9-CM   1. Controlled type 2 diabetes mellitus without complication, without long-term current use of insulin  E11.9 250.00   2. Hyperlipidemia associated with type 2 diabetes mellitus  E11.69 250.80    E78.5 272.4        Plan:     1. Controlled type 2 diabetes mellitus without complication, without long-term current use of insulin  Assessment & Plan:  Patient agrees to start medication management for diabetes.    Rx trial of empagliflozin (JARDIANCE) 10 mg tablet daily.  Monitor fasting blood glucose daily and log results.   Notify provider of blood glucose <70 or >300.  Notify provider of medication side effects.  On Statin according to guidelines. Follow ADA Diet. Avoid soda, simple sweets, and limit rice/pasta/breads/starches (no more than 45-50 grams per meal).  Goal BMI <30.  Exercise/walk 5 times per week for 30  minutes per day.  Stressed importance of daily foot exams.  Stressed importance of annual dilated eye exam.    Orders:  -     empagliflozin (JARDIANCE) 10 mg tablet; Take 1 tablet (10 mg total) by mouth once daily.  Dispense: 30 tablet; Refill: 0  -     Hemoglobin A1C; Future; Expected date: 10/18/2024  -     Comprehensive Metabolic Panel; Future; Expected date: 10/18/2024    2. Hyperlipidemia associated with type 2 diabetes mellitus  Assessment & Plan:  Stable. Continue crestor as prescribed nightly.  Notify Provider of medications side effects, abdominal pain, muscle aches.  Continue to follow a low cholesterol, low fat diet. Avoid fried foods and high saturated fats. Increase fiber intake. Foods high in soluble fiber, such as oats, beans, lentils, fruits, and vegetables, can help lower LDL cholesterol levels.   Exercise/walk 5 times per week for 30 minutes a day. Regular physical activity can help raise HDL (high-density lipoprotein) cholesterol and lower LDL cholesterol.    Orders:  -     Lipid Panel; Future; Expected date: 10/18/2024         Follow up in one month Diabetes. In addition to their scheduled follow up, the patient has also been instructed to follow up on as needed basis.     Future Appointments   Date Time Provider Department Center   11/18/2024  2:45 PM Mahnaz Lomeli FNP [facility] [place]    12/11/2024  3:45 PM Mahnaz Lomeli FNP [facility] [place]    12/17/2024  3:30 PM Natty Low NP [facility] [place]

## 2024-10-18 NOTE — ASSESSMENT & PLAN NOTE
Patient agrees to start medication management for diabetes.    Rx trial of empagliflozin (JARDIANCE) 10 mg tablet daily.  Monitor fasting blood glucose daily and log results.   Notify provider of blood glucose <70 or >300.  Notify provider of medication side effects.  On Statin according to guidelines. Follow ADA Diet. Avoid soda, simple sweets, and limit rice/pasta/breads/starches (no more than 45-50 grams per meal).  Goal BMI <30.  Exercise/walk 5 times per week for 30 minutes per day.  Stressed importance of daily foot exams.  Stressed importance of annual dilated eye exam.

## 2024-10-21 ENCOUNTER — TELEPHONE (OUTPATIENT)
Dept: FAMILY MEDICINE | Facility: CLINIC | Age: 54
End: 2024-10-21
Payer: COMMERCIAL

## 2024-10-21 DIAGNOSIS — E11.9 CONTROLLED TYPE 2 DIABETES MELLITUS WITHOUT COMPLICATION, WITHOUT LONG-TERM CURRENT USE OF INSULIN: ICD-10-CM

## 2024-10-21 NOTE — TELEPHONE ENCOUNTER
----- Message from Marta sent at 10/21/2024 12:02 PM CDT -----  Regarding: PA  .Type:  RX Refill Request    Who Called: pt  Refill or New Rx:refill  RX Name and Strength:empagliflozin (JARDIANCE) 10 mg tablet  How is the patient currently taking it? (ex. 1XDay):Take 1 tablet (10 mg total) by mouth once daily  Is this a 30 day or 90 day RX:30  Preferred Pharmacy with phone number:[id number] [place], [place] - [address]  Local or Mail Order:  Ordering Provider:  Would the patient rather a call back or a response via [facility]? cb  Best Call Back Number:  Additional Information:

## 2024-10-22 ENCOUNTER — DOCUMENTATION ONLY (OUTPATIENT)
Dept: FAMILY MEDICINE | Facility: CLINIC | Age: 54
End: 2024-10-22
Payer: COMMERCIAL

## 2024-10-22 LAB
LEFT EYE DM RETINOPATHY: NEGATIVE
RIGHT EYE DM RETINOPATHY: NEGATIVE

## 2024-11-15 ENCOUNTER — LAB VISIT (OUTPATIENT)
Dept: LAB | Facility: HOSPITAL | Age: 54
End: 2024-11-15
Payer: COMMERCIAL

## 2024-11-15 DIAGNOSIS — E11.69 HYPERLIPIDEMIA ASSOCIATED WITH TYPE 2 DIABETES MELLITUS: ICD-10-CM

## 2024-11-15 DIAGNOSIS — E78.5 HYPERLIPIDEMIA ASSOCIATED WITH TYPE 2 DIABETES MELLITUS: ICD-10-CM

## 2024-11-15 DIAGNOSIS — E11.9 CONTROLLED TYPE 2 DIABETES MELLITUS WITHOUT COMPLICATION, WITHOUT LONG-TERM CURRENT USE OF INSULIN: ICD-10-CM

## 2024-11-15 LAB
ALBUMIN SERPL-MCNC: 3.9 G/DL (ref 3.5–5)
ALBUMIN/GLOB SERPL: 1.1 RATIO (ref 1.1–2)
ALP SERPL-CCNC: 118 UNIT/L (ref 40–150)
ALT SERPL-CCNC: 30 UNIT/L (ref 0–55)
ANION GAP SERPL CALC-SCNC: 8 MEQ/L
AST SERPL-CCNC: 19 UNIT/L (ref 5–34)
BILIRUB SERPL-MCNC: 0.2 MG/DL
BUN SERPL-MCNC: 12.2 MG/DL (ref 9.8–20.1)
CALCIUM SERPL-MCNC: 9.4 MG/DL (ref 8.4–10.2)
CHLORIDE SERPL-SCNC: 108 MMOL/L (ref 98–107)
CHOLEST SERPL-MCNC: 107 MG/DL
CHOLEST/HDLC SERPL: 3 {RATIO} (ref 0–5)
CO2 SERPL-SCNC: 25 MMOL/L (ref 22–29)
CREAT SERPL-MCNC: 0.79 MG/DL (ref 0.55–1.02)
CREAT/UREA NIT SERPL: 15
EST. AVERAGE GLUCOSE BLD GHB EST-MCNC: 151.3 MG/DL
GFR SERPLBLD CREATININE-BSD FMLA CKD-EPI: >60 ML/MIN/1.73/M2
GLOBULIN SER-MCNC: 3.6 GM/DL (ref 2.4–3.5)
GLUCOSE SERPL-MCNC: 116 MG/DL (ref 74–100)
HBA1C MFR BLD: 6.9 %
HDLC SERPL-MCNC: 42 MG/DL (ref 35–60)
LDLC SERPL CALC-MCNC: 51 MG/DL (ref 50–140)
POTASSIUM SERPL-SCNC: 4.2 MMOL/L (ref 3.5–5.1)
PROT SERPL-MCNC: 7.5 GM/DL (ref 6.4–8.3)
SODIUM SERPL-SCNC: 141 MMOL/L (ref 136–145)
TRIGL SERPL-MCNC: 71 MG/DL (ref 37–140)
VLDLC SERPL CALC-MCNC: 14 MG/DL

## 2024-11-15 PROCEDURE — 80053 COMPREHEN METABOLIC PANEL: CPT

## 2024-11-15 PROCEDURE — 80061 LIPID PANEL: CPT

## 2024-11-15 PROCEDURE — 36415 COLL VENOUS BLD VENIPUNCTURE: CPT

## 2024-11-15 PROCEDURE — 83036 HEMOGLOBIN GLYCOSYLATED A1C: CPT

## 2024-11-18 ENCOUNTER — OFFICE VISIT (OUTPATIENT)
Dept: FAMILY MEDICINE | Facility: CLINIC | Age: 54
End: 2024-11-18
Payer: COMMERCIAL

## 2024-11-18 ENCOUNTER — PATIENT MESSAGE (OUTPATIENT)
Dept: FAMILY MEDICINE | Facility: CLINIC | Age: 54
End: 2024-11-18

## 2024-11-18 VITALS
HEIGHT: 61 IN | WEIGHT: 150.63 LBS | DIASTOLIC BLOOD PRESSURE: 79 MMHG | HEART RATE: 71 BPM | OXYGEN SATURATION: 99 % | SYSTOLIC BLOOD PRESSURE: 122 MMHG | BODY MASS INDEX: 28.44 KG/M2

## 2024-11-18 DIAGNOSIS — E78.5 HYPERLIPIDEMIA ASSOCIATED WITH TYPE 2 DIABETES MELLITUS: Primary | ICD-10-CM

## 2024-11-18 DIAGNOSIS — E11.9 CONTROLLED TYPE 2 DIABETES MELLITUS WITHOUT COMPLICATION, WITHOUT LONG-TERM CURRENT USE OF INSULIN: ICD-10-CM

## 2024-11-18 DIAGNOSIS — E11.69 HYPERLIPIDEMIA ASSOCIATED WITH TYPE 2 DIABETES MELLITUS: Primary | ICD-10-CM

## 2024-11-18 PROCEDURE — 3008F BODY MASS INDEX DOCD: CPT | Mod: CPTII,,,

## 2024-11-18 PROCEDURE — 3044F HG A1C LEVEL LT 7.0%: CPT | Mod: CPTII,,,

## 2024-11-18 PROCEDURE — 1160F RVW MEDS BY RX/DR IN RCRD: CPT | Mod: CPTII,,,

## 2024-11-18 PROCEDURE — G2211 COMPLEX E/M VISIT ADD ON: HCPCS | Mod: ,,,

## 2024-11-18 PROCEDURE — 3074F SYST BP LT 130 MM HG: CPT | Mod: CPTII,,,

## 2024-11-18 PROCEDURE — 2023F DILAT RTA XM W/O RTNOPTHY: CPT | Mod: CPTII,,,

## 2024-11-18 PROCEDURE — 99214 OFFICE O/P EST MOD 30 MIN: CPT | Mod: ,,,

## 2024-11-18 PROCEDURE — 1159F MED LIST DOCD IN RCRD: CPT | Mod: CPTII,,,

## 2024-11-18 PROCEDURE — 3078F DIAST BP <80 MM HG: CPT | Mod: CPTII,,,

## 2024-11-18 RX ORDER — INSULIN PUMP SYRINGE, 3 ML
EACH MISCELLANEOUS
Qty: 1 EACH | Refills: 0 | Status: SHIPPED | OUTPATIENT
Start: 2024-11-18

## 2024-11-18 RX ORDER — LANCETS
EACH MISCELLANEOUS
Qty: 100 EACH | Refills: 11 | Status: SHIPPED | OUTPATIENT
Start: 2024-11-18

## 2024-11-18 NOTE — ASSESSMENT & PLAN NOTE
Lab Results   Component Value Date    HGBA1C 6.9 11/15/2024      Diabetes Medications       empagliflozin (JARDIANCE) 10 mg tablet Take 1 tablet (10 mg total) by mouth once daily.     A1C improving. Continue diabetes medication plan. Monitor fasting blood glucose daily and log results.   Notify provider of blood glucose <70 or >300. Notify provider of medication side effects.  On Statin according to guidelines.  Stressed the importance of following an ADA Diet.   Prioritize lean protein (ex. chicken, fish) and complex carbohydrate (ex. green vegetables, sweet potatoes).  Avoid soda, simple sweets, and limit rice/pasta/breads/starches (no more than 45-50 grams per meal).  Maintain healthy weight with goal BMI <30. Exercise 5 times per week for 30 minutes per day.  Stressed importance of daily foot exams and annual dilated eye exam.

## 2024-11-18 NOTE — PROGRESS NOTES
Patient ID: [id number]     Chief Complaint: Follow-up  Diabetes  HPI:     Zonia Washington is a 54 y.o. female here today for a follow up of diabetes and review of lab results. Patient reports tolerating diabetes medication well. Current rx is Jardiance 10 mg daily. She reports daily medication compliance and she denies medication side effects. She states she struggles with her diet and is not sure about breakfast meals. Discussed referral for DM education. Diabetic eye and foot exams are UTD. She denies episodes of hyperglycemia/hypoglycemia. She has not monitored her FBG. She reports her glucometer is broken and she is requesting a new glucometer with supplies.   Cholesterol has improved. She reports daily compliance with statin. She denies medication side effect.   Patient needs influenza vaccination. Patient would like to defer vaccination to her next visit. She has MMG order from GYN.    Past Medical History:   Diagnosis Date    Mixed hyperlipidemia 10/31/2023    Obesity, unspecified     Osteoarthritis of spine with radiculopathy, cervical region     Psoriasis 05/29/2022    Type 2 diabetes mellitus without complications         Past Surgical History:   Procedure Laterality Date    CHOLECYSTECTOMY      DILATION AND CURETTAGE OF UTERUS      EPIDURAL STEROID INJECTION INTO CERVICAL SPINE N/A 8/19/2024    Procedure: INJECTION, STEROID, SPINE, CERVICAL, EPIDURAL  ///C7-T1;  Surgeon: Chloe Esquivel MD;  Location: [facility] OR;  Service: Pain Management;  Laterality: N/A;  CA C7-T1    HERNIA REPAIR      HERNIA REPAIR      HYSTERECTOMY      MYOMECTOMY          Social History     Tobacco Use    Smoking status: Never    Smokeless tobacco: Never   Substance and Sexual Activity    Alcohol use: Yes     Comment: Occasionally    Drug use: Never    Sexual activity: Not Currently     Partners: Male     Birth control/protection: None        Current Outpatient Medications   Medication Instructions    acitretin (SORIATANE) 20  mg, Daily    aspirin (ECOTRIN) 81 mg    blood sugar diagnostic Strp To check BG 2 times daily, to use with insurance preferred meter. Adjust volume according to insurance.    blood-glucose meter kit To check BG 2 times daily, to use with insurance preferred meter    cetirizine (ZYRTEC) 10 mg    cholecalciferol, vitamin D3, (VITAMIN D3) 10 mcg (400 unit) Tab No dose, route, or frequency recorded.    diclofenac sodium (VOLTAREN) 2 g, Topical (Top), 4 times daily PRN    empagliflozin (JARDIANCE) 10 mg, Oral, Daily    lancets Misc To check BG 2 times daily, to use with insurance preferred meter Adjust volume according to insurance.    multivit with min-folic acid (ONE-A-DAY WOMEN'S 50 PLUS) 0.4 mg Tab No dose, route, or frequency recorded.    rosuvastatin (CRESTOR) 5 mg, Oral, Nightly    triamcinolone acetonide 0.1% (KENALOG) 0.1 % cream Topical (Top), 2 times daily       Review of patient's allergies indicates:   Allergen Reactions    Latex Hives, Itching and Rash        Patient Care Team:  Zara Fairbanks MD as PCP - General (Family Medicine)     Subjective:     Review of Systems    Constitutional: Denies Change in appetite. Denies Chills. Denies Fever. Denies Night sweats.  Eye: Denies Blurred vision. Denies Discharge. Denies Eye pain.  ENT: Denies Decreased hearing. Denies Sore throat. Denies Swollen glands.  Respiratory: Denies Cough. Denies Shortness of breath. Denies Shortness of breath with exertion. Denies Wheezing.  Cardiovascular: Denies Chest pain at rest. Denies Chest pain with exertion. Denies Irregular heartbeat. Denies Palpitations.  Gastrointestinal: Denies Abdominal pain. Denies Diarrhea. Denies Nausea. Denies Vomiting. Denies Hematemesis or Hematochezia.  Genitourinary: Denies Dysuria. Denies Urinary frequency. Denies Urinary urgency. Denies Blood in urine.  Endocrine: Denies Cold intolerance. Denies Excessive thirst. Denies Heat intolerance. Denies Weight loss. Denies Weight  "gain.  Musculoskeletal: Denies Painful joints. Denies Weakness.  Integumentary: Denies Rash. Denies Itching. Denies Dry skin.  Neurologic: Denies Dizziness. Denies Fainting. Denies Headache.  Psychiatric: As per HPI. Denies Depression. Denies Anxiety. Denies Suicidal/Homicidal ideations.    All Other ROS: Negative except as stated in HPI.    12 point review of systems conducted, negative except as stated in the history of present illness. See HPI for details.    Objective:     Visit Vitals  /79 (BP Location: Left arm, Patient Position: Sitting)   Pulse 71   Ht 5' 1" (1.549 m)   Wt 68.3 kg (150 lb 9.6 oz)   SpO2 99%   BMI 28.46 kg/m²       Physical Exam    General: Alert and oriented, No acute distress.   Head: Normocephalic, Atraumatic.  Eye: Pupils are equal, round and reactive to light, Extraocular movements are intact, Sclera non-icteric.  Ears/Nose/Throat: Normal, Mucosa moist,Clear.  Neck/Thyroid: Supple, Non-tender, No carotid bruit, No lymphadenopathy, No JVD, Full range of motion.  Respiratory: Clear to auscultation bilaterally; No wheezes, rales or rhonchi,Non-labored respirations, Symmetrical chest wall expansion.  Cardiovascular: Regular rate and rhythm, S1/S2 normal, No murmurs, rubs or gallops.  Gastrointestinal: Soft, Non-tender, Non-distended, Normal bowel sounds, No palpable organomegaly.  Musculoskeletal: Normal range of motion.  Integumentary: Warm, Dry, Intact, No suspicious lesions or rashes.  Extremities: No clubbing, cyanosis or edema  Neurologic: No focal deficits, Motor strength normal upper and lower extremities, Sensory exam intact.  Psychiatric: Normal interaction, Coherent speech, Euthymic mood, Appropriate affect     Labs Reviewed:     Chemistry:  Lab Results   Component Value Date     11/15/2024    K 4.2 11/15/2024    BUN 12.2 11/15/2024    CREATININE 0.79 11/15/2024    EGFRNORACEVR >60 11/15/2024    GLUCOSE 116 (H) 11/15/2024    CALCIUM 9.4 11/15/2024    ALKPHOS 118 " 11/15/2024    LABPROT 7.5 11/15/2024    ALBUMIN 3.9 11/15/2024    AST 19 11/15/2024    ALT 30 11/15/2024    TSH 1.712 12/04/2023        Lab Results   Component Value Date    HGBA1C 6.9 11/15/2024     Lipid Panel:  Lab Results   Component Value Date    CHOL 107 11/15/2024    HDL 42 11/15/2024    LDL 51.00 11/15/2024    TRIG 71 11/15/2024    TOTALCHOLEST 3 11/15/2024           Assessment:       ICD-10-CM ICD-9-CM   1. Hyperlipidemia associated with type 2 diabetes mellitus  E11.69 250.80    E78.5 272.4   2. Controlled type 2 diabetes mellitus without complication, without long-term current use of insulin  E11.9 250.00        Plan:     1. Hyperlipidemia associated with type 2 diabetes mellitus  Assessment & Plan:  Cholesterol improving. Continue Crestor as prescribed nightly. Notify provider of medication side effects, abdominal pain, muscle aches.  Continue to follow a low-cholesterol, low-fat diet. Avoid fried foods, high saturated fat.  Increase fiber intake. Exercise/walk 5 times per week for 30 minutes a day. Regular physical activity can help raise HDL (high-density lipoprotein) cholesterol and lower LDL cholesterol.       2. Controlled type 2 diabetes mellitus without complication, without long-term current use of insulin  Assessment & Plan:  Lab Results   Component Value Date    HGBA1C 6.9 11/15/2024      Diabetes Medications       empagliflozin (JARDIANCE) 10 mg tablet Take 1 tablet (10 mg total) by mouth once daily.     A1C improving. Continue diabetes medication plan. Monitor fasting blood glucose daily and log results.   Notify provider of blood glucose <70 or >300. Notify provider of medication side effects.  On Statin according to guidelines.  Stressed the importance of following an ADA Diet.   Prioritize lean protein (ex. chicken, fish) and complex carbohydrate (ex. green vegetables, sweet potatoes).  Avoid soda, simple sweets, and limit rice/pasta/breads/starches (no more than 45-50 grams per  meal).  Maintain healthy weight with goal BMI <30. Exercise 5 times per week for 30 minutes per day.  Stressed importance of daily foot exams and annual dilated eye exam.    Orders:  -     blood-glucose meter kit; To check BG 2 times daily, to use with insurance preferred meter  Dispense: 1 each; Refill: 0  -     lancets Misc; To check BG 2 times daily, to use with insurance preferred meter Adjust volume according to insurance.  Dispense: 100 each; Refill: 11  -     blood sugar diagnostic Strp; To check BG 2 times daily, to use with insurance preferred meter. Adjust volume according to insurance.  Dispense: 100 strip; Refill: 11  -     Ambulatory referral/consult to Nutrition Services; Future; Expected date: 11/25/2024         Keep future appointments as scheduled. In addition to their scheduled follow up, the patient has also been instructed to follow up on as needed basis.     Future Appointments   Date Time Provider Department Center   12/11/2024  3:45 PM Mahnaz Lomeli FNP [facility] [place]    12/17/2024  3:30 PM Natty Low NP [facility] [place]         [facility] [place]

## 2024-12-11 ENCOUNTER — OFFICE VISIT (OUTPATIENT)
Dept: FAMILY MEDICINE | Facility: CLINIC | Age: 54
End: 2024-12-11
Payer: COMMERCIAL

## 2024-12-11 VITALS
BODY MASS INDEX: 28.38 KG/M2 | TEMPERATURE: 98 F | OXYGEN SATURATION: 99 % | DIASTOLIC BLOOD PRESSURE: 76 MMHG | HEIGHT: 61 IN | RESPIRATION RATE: 18 BRPM | SYSTOLIC BLOOD PRESSURE: 123 MMHG | WEIGHT: 150.31 LBS | HEART RATE: 70 BPM

## 2024-12-11 DIAGNOSIS — Z13.820 SCREENING FOR OSTEOPOROSIS: ICD-10-CM

## 2024-12-11 DIAGNOSIS — E66.3 OVERWEIGHT (BMI 25.0-29.9): ICD-10-CM

## 2024-12-11 DIAGNOSIS — Z78.0 POSTMENOPAUSAL: ICD-10-CM

## 2024-12-11 DIAGNOSIS — E11.69 HYPERLIPIDEMIA ASSOCIATED WITH TYPE 2 DIABETES MELLITUS: ICD-10-CM

## 2024-12-11 DIAGNOSIS — E78.5 HYPERLIPIDEMIA ASSOCIATED WITH TYPE 2 DIABETES MELLITUS: ICD-10-CM

## 2024-12-11 DIAGNOSIS — Z00.00 WELLNESS EXAMINATION: Primary | ICD-10-CM

## 2024-12-11 DIAGNOSIS — E11.9 CONTROLLED TYPE 2 DIABETES MELLITUS WITHOUT COMPLICATION, WITHOUT LONG-TERM CURRENT USE OF INSULIN: ICD-10-CM

## 2024-12-11 LAB
BACTERIA #/AREA URNS AUTO: ABNORMAL /HPF
BILIRUB UR QL STRIP.AUTO: NEGATIVE
CLARITY UR: CLEAR
COLOR UR AUTO: YELLOW
CREAT UR-MCNC: 76.6 MG/DL (ref 45–106)
GLUCOSE UR QL STRIP: ABNORMAL
HGB UR QL STRIP: NEGATIVE
KETONES UR QL STRIP: NEGATIVE
LEUKOCYTE ESTERASE UR QL STRIP: NEGATIVE
MICROALBUMIN UR-MCNC: 7.3 UG/ML
MICROALBUMIN/CREAT RATIO PNL UR: 9.5 MG/GM CR (ref 0–30)
NITRITE UR QL STRIP: NEGATIVE
PH UR STRIP: 6 [PH]
PROT UR QL STRIP: NEGATIVE
RBC #/AREA URNS AUTO: ABNORMAL /HPF
SP GR UR STRIP.AUTO: 1.02 (ref 1–1.03)
SQUAMOUS #/AREA URNS LPF: ABNORMAL /HPF
UROBILINOGEN UR STRIP-ACNC: NORMAL
WBC #/AREA URNS AUTO: ABNORMAL /HPF

## 2024-12-11 PROCEDURE — 82043 UR ALBUMIN QUANTITATIVE: CPT

## 2024-12-11 PROCEDURE — 81001 URINALYSIS AUTO W/SCOPE: CPT

## 2024-12-11 NOTE — PROGRESS NOTES
Patient ID: [id number]     Chief Complaint: Wellness Exam    HPI:     Zonia Washington is a 54 y.o. female here today for an annual wellness visit.   Cervical Cancer Screening - S/P HYST in 2012 due to endometriosis. Last well women exam 2024 with Gayle Easton.   Breast Cancer Screening - Last Mammogram on 2/2024, Normal. Patient has order form GYN.  Colon Cancer Screening - Cologuard 2/2024, negative. Follow up 2/2027.  Osteoporosis Screening - Needs DEXA scan.   Eye Exam - 10/2024 with Dr. Aceves.  Dental Exam - Need dental cleanings. Declines referral.  Vaccinations - Needs pneumococcal, influenza vaccine. Patient would like to defer until next.    She admits to seat belt use. She denies caffeine use, tobacco use, alcohol use.    She follows up with pain management for her right shoulder. She follows up with Pulmonology, Dr. EBONY Aguiar for pulmonary care. She sees Dr. CANDIS Young for psoriasis management and Dr. Peralta for foot care. Foot exam was completed 12/3/2024  Cholesterol is well controlled with current rx. She reports daily compliance and denies medication side effects.  Diabetes is controlled with current rx. She denies hyperglycemic episodes. She monitors her FBG periodically with readings ranging .    Reviewed and discussed lab results.Sh denies the need for medication refills. Overall she feels well. No other complaints today.     Advance Care Planning  Date: 12/11/2024  Patient did not wish or was not able to name a surrogate decision maker or provide an Advance Care Plan.    Past Medical History:   Diagnosis Date    Mixed hyperlipidemia 10/31/2023    Obesity, unspecified     Osteoarthritis of spine with radiculopathy, cervical region     Psoriasis 05/29/2022    Type 2 diabetes mellitus without complications         Past Surgical History:   Procedure Laterality Date    CHOLECYSTECTOMY      DILATION AND CURETTAGE OF UTERUS      EPIDURAL STEROID INJECTION INTO CERVICAL SPINE N/A 8/19/2024     Procedure: INJECTION, STEROID, SPINE, CERVICAL, EPIDURAL  ///C7-T1;  Surgeon: Chloe Esquivel MD;  Location: [facility] OR;  Service: Pain Management;  Laterality: N/A;  CA C7-T1    HERNIA REPAIR      HERNIA REPAIR      HYSTERECTOMY      MYOMECTOMY          Social History     Socioeconomic History    Marital status:    Tobacco Use    Smoking status: Never    Smokeless tobacco: Never   Substance and Sexual Activity    Alcohol use: Yes     Comment: Occasionally    Drug use: Never    Sexual activity: Not Currently     Partners: Male     Birth control/protection: None   Social History Narrative    ** Merged History Encounter **          Social Drivers of Health     Financial Resource Strain: Low Risk  (11/29/2023)    Overall Financial Resource Strain (CARDIA)     Difficulty of Paying Living Expenses: Not hard at all   Food Insecurity: No Food Insecurity (11/29/2023)    Hunger Vital Sign     Worried About Running Out of Food in the Last Year: Never true     Ran Out of Food in the Last Year: Never true   Transportation Needs: No Transportation Needs (11/29/2023)    PRAPARE - Transportation     Lack of Transportation (Medical): No     Lack of Transportation (Non-Medical): No   Physical Activity: Insufficiently Active (11/29/2023)    Exercise Vital Sign     Days of Exercise per Week: 5 days     Minutes of Exercise per Session: 10 min   Stress: No Stress Concern Present (11/29/2023)    [employer] - Occupational Stress Questionnaire     Feeling of Stress : Not at all   Housing Stability: Low Risk  (11/29/2023)    Housing Stability Vital Sign     Unable to Pay for Housing in the Last Year: No     Number of Places Lived in the Last Year: 1     Unstable Housing in the Last Year: No        Current Outpatient Medications   Medication Instructions    aspirin (ECOTRIN) 81 mg    blood sugar diagnostic Strp To check BG 2 times daily, to use with insurance preferred meter. Adjust volume according to  insurance.    blood-glucose meter kit To check BG 2 times daily, to use with insurance preferred meter    cetirizine (ZYRTEC) 10 mg    cholecalciferol, vitamin D3, (VITAMIN D3) 10 mcg (400 unit) Tab No dose, route, or frequency recorded.    diclofenac sodium (VOLTAREN) 2 g, Topical (Top), 4 times daily PRN    empagliflozin (JARDIANCE) 10 mg, Oral, Daily    lancets Misc To check BG 2 times daily, to use with insurance preferred meter Adjust volume according to insurance.    multivit with min-folic acid (ONE-A-DAY WOMEN'S 50 PLUS) 0.4 mg Tab No dose, route, or frequency recorded.    rosuvastatin (CRESTOR) 5 mg, Oral, Nightly    triamcinolone acetonide 0.1% (KENALOG) 0.1 % cream Topical (Top), 2 times daily       Review of patient's allergies indicates:   Allergen Reactions    Latex Hives, Itching and Rash       Immunization History   Administered Date(s) Administered    COVID-19, MRNA, LN-S, PF (Pfizer) (Purple Cap) 03/26/2021, 04/16/2021, 11/26/2021    COVID-19, mRNA, LNP-S, bivalent booster, PF (PFIZER OMICRON) 05/27/2023    Influenza 10/04/2021    Influenza - Quadrivalent - PF *Preferred* (6 months and older) 10/20/2022, 10/31/2023    Influenza - Trivalent - Fluarix, Flulaval, Fluzone, Afluria - PF 10/04/2021    Tdap 10/04/2021    Zoster Recombinant 10/04/2021, 01/04/2022         Patient Care Team:  Zara Fairbanks MD as PCP - General (Family Medicine)     Subjective:     Review of Systems    Constitutional: Denies Change in appetite. Denies Chills. Denies Fever. Denies Night sweats.  Eye: Denies Blurred vision. Denies Discharge. Denies Eye pain.  ENT: Denies Decreased hearing. Denies Sore throat. Denies Swollen glands.  Respiratory: Denies Cough. Denies Shortness of breath. Denies Shortness of breath with exertion. Denies Wheezing.  Cardiovascular: Denies Chest pain at rest. Denies Chest pain with exertion. Denies Irregular heartbeat. Denies Palpitations.  Gastrointestinal: Denies Abdominal pain. Denies  "Diarrhea. Denies Nausea. Denies Vomiting. Denies Hematemesis or Hematochezia.  Genitourinary: Denies Dysuria. Denies Urinary frequency. Denies Urinary urgency. Denies Blood in urine.  Endocrine: Denies Cold intolerance. Denies Excessive thirst. Denies Heat intolerance. Denies Weight loss. Denies Weight gain.  Musculoskeletal: Denies Painful joints. Denies Weakness.  Integumentary: Denies Rash. Denies Itching. Denies Dry skin.  Neurologic: Denies Dizziness. Denies Fainting. Denies Headache.  Psychiatric: As per HPI. Denies Depression. Denies Anxiety. Denies Suicidal/Homicidal ideations.    All Other ROS: Negative except as stated in HPI.    12 point review of systems conducted, negative except as stated in the history of present illness. See HPI for details.    Objective:     Visit Vitals  /76 (BP Location: Right arm, Patient Position: Sitting)   Pulse 70   Temp 97.6 °F (36.4 °C) (Oral)   Resp 18   Ht 5' 1" (1.549 m)   Wt 68.2 kg (150 lb 4.8 oz)   SpO2 99%   BMI 28.40 kg/m²       Physical Exam  General: Alert and oriented, No acute distress. Obese.   Head: Normocephalic, Atraumatic.  Eye: Pupils are equal, round and reactive to light, Extraocular movements are intact, Sclera non-icteric.  Ears/Nose/Throat: Normal, Mucosa moist,Clear.  Neck/Thyroid: Supple, Non-tender, No carotid bruit, No palpable thyromegaly or thyroid nodule, No lymphadenopathy, No JVD, Full range of motion.  Respiratory: Clear to auscultation bilaterally; No wheezes, rales or rhonchi,Non-labored respirations, Symmetrical chest wall expansion.  Cardiovascular: Regular rate and rhythm, S1/S2 normal, No murmurs, rubs or gallops.  Gastrointestinal: Soft, Non-tender, Non-distended, Normal bowel sounds, No palpable organomegaly.  Musculoskeletal: Normal range of motion.  Integumentary: Warm, Dry, Intact, No suspicious lesions or rashes.  Extremities: No clubbing, cyanosis or edema  Neurologic: No focal deficits, Cranial Nerves II-XII are grossly " intact, Motor strength normal upper and lower extremities, Sensory exam intact.  Psychiatric: Normal interaction, Coherent speech, Euthymic mood, Appropriate affect     Assessment:       ICD-10-CM ICD-9-CM   1. Wellness examination  Z00.00 V70.0   2. Hyperlipidemia associated with type 2 diabetes mellitus  E11.69 250.80    E78.5 272.4   3. Overweight (BMI 25.0-29.9)  E66.3 278.02   4. Controlled type 2 diabetes mellitus without complication, without long-term current use of insulin  E11.9 250.00   5. Screening for osteoporosis  Z13.820 V82.81   6. Postmenopausal  Z78.0 V49.81        Plan:     1. Wellness examination  Assessment & Plan:  Diet, exercise and 10% weight loss encouraged.  Monthly breast self exam recommended.  Recommend biannual dental exams.   Continue well women exams and annual eye exams.  Go to the emergency department if symptoms of chest pain/tightness, shortness of breath, increased pain, fall, change in level of consciousness, fever/chills, temp >100.4 or any acute illness.   Orders:  -     CBC Auto Differential; Future  -     TSH; Future  -     Urinalysis, Reflex to Urine Culture    2. Hyperlipidemia associated with type 2 diabetes mellitus  Assessment & Plan:  Continue crestor as prescribed. Notify the provider if you experience abdominal pain, muscle aches or cramps.  Follow low cholesterol, low fat diet. Avoid fried foods and high saturated fats.  Increase fiber intake. Foods high in soluble fiber, such as oats, beans, lentils, fruits, and vegetables, can help lower LDL cholesterol levels.   Exercise/walk 5 times per week for 30 minutes a day. Regular physical activity can help raise HDL (high-density lipoprotein) cholesterol and lower LDL cholesterol.    Orders:  -     Microalbumin/creatinine urine ratio    3. Overweight (BMI 25.0-29.9)  Assessment & Plan:  Body mass index is 28.4 kg/m².  Goal BMI <30. Exercise 5 times a week for 30 minutes per day. Recommend adding resistance training to  your exercise program.  Prioritize protein (chicken, fish, lean ground beef) and complex carbohydrate (green vegetables, sweet potato) at every meal.   Avoid soda, simple sugars, excessive rice, potatoes or bread. Limit fast foods and fried foods.  Eat plenty of fresh fruits and vegetables with lean meats daily.  Do not skip meals. Eat a balanced portion size.  Avoid fad diets. Consider permanent healthy life style changes.    4. Controlled type 2 diabetes mellitus without complication, without long-term current use of insulin  Assessment & Plan:  HGBA1C 6.9%. Continue diabetes medication plan. Monitor fasting blood glucose daily and log results.   Notify provider of blood glucose <70 or >300. Notify provider of medication side effects.  On Statin according to guidelines. Stressed the importance of following an ADA Diet.   Prioritize lean protein (ex. chicken, fish) and complex carbohydrate (ex. green vegetables, sweet potatoes).  Avoid soda, simple sweets, and limit rice/pasta/breads/starches (no more than 45-50 grams per meal).  Maintain healthy weight with goal BMI <30. Exercise 5 times per week for 30 minutes per day.  Stressed importance of daily foot exams and annual dilated eye exam.    5. Screening for osteoporosis  -     DXA Bone Density Axial Skeleton 1 or more sites; Future    6. Postmenopausal  -     DXA Bone Density Axial Skeleton 1 or more sites; Future       Follow up in 6 mths DM (check A1C), Chol. In addition to their scheduled follow up, the patient has also been instructed to follow up on as needed basis.     Future Appointments   Date Time Provider Department Center   12/17/2024  3:30 PM Natty Low NP [facility]    6/11/2025 10:00 AM Zara Fairbanks MD [facility] DEYSI Cho

## 2024-12-12 ENCOUNTER — PATIENT MESSAGE (OUTPATIENT)
Dept: FAMILY MEDICINE | Facility: CLINIC | Age: 54
End: 2024-12-12
Payer: COMMERCIAL

## 2024-12-12 NOTE — ASSESSMENT & PLAN NOTE
Diet, exercise and 10% weight loss encouraged.  Monthly breast self exam recommended.  Recommend biannual dental exams.   Continue well women exams and annual eye exams.  Go to the emergency department if symptoms of chest pain/tightness, shortness of breath, increased pain, fall, change in level of consciousness, fever/chills, temp >100.4 or any acute illness.

## 2024-12-12 NOTE — ASSESSMENT & PLAN NOTE
Continue crestor as prescribed. Notify the provider if you experience abdominal pain, muscle aches or cramps.  Follow low cholesterol, low fat diet. Avoid fried foods and high saturated fats.  Increase fiber intake. Foods high in soluble fiber, such as oats, beans, lentils, fruits, and vegetables, can help lower LDL cholesterol levels.   Exercise/walk 5 times per week for 30 minutes a day. Regular physical activity can help raise HDL (high-density lipoprotein) cholesterol and lower LDL cholesterol.

## 2024-12-12 NOTE — ASSESSMENT & PLAN NOTE
Body mass index is 28.4 kg/m².  Goal BMI <30. Exercise 5 times a week for 30 minutes per day. Recommend adding resistance training to your exercise program.  Prioritize protein (chicken, fish, lean ground beef) and complex carbohydrate (green vegetables, sweet potato) at every meal.   Avoid soda, simple sugars, excessive rice, potatoes or bread. Limit fast foods and fried foods.  Eat plenty of fresh fruits and vegetables with lean meats daily.  Do not skip meals. Eat a balanced portion size.  Avoid fad diets. Consider permanent healthy life style changes.

## 2024-12-12 NOTE — ASSESSMENT & PLAN NOTE
HGBA1C 6.9%. Continue diabetes medication plan. Monitor fasting blood glucose daily and log results.   Notify provider of blood glucose <70 or >300. Notify provider of medication side effects.  On Statin according to guidelines. Stressed the importance of following an ADA Diet.   Prioritize lean protein (ex. chicken, fish) and complex carbohydrate (ex. green vegetables, sweet potatoes).  Avoid soda, simple sweets, and limit rice/pasta/breads/starches (no more than 45-50 grams per meal).  Maintain healthy weight with goal BMI <30. Exercise 5 times per week for 30 minutes per day.  Stressed importance of daily foot exams and annual dilated eye exam.

## 2024-12-13 ENCOUNTER — TELEPHONE (OUTPATIENT)
Dept: FAMILY MEDICINE | Facility: CLINIC | Age: 54
End: 2024-12-13
Payer: COMMERCIAL

## 2024-12-13 NOTE — TELEPHONE ENCOUNTER
----- Message from DEYSI Hess sent at 12/12/2024 10:28 AM CST -----  Urinalysis - no bacteria; Glucose in her urine is caused by the medication Jardiance which causes excess glucose to be excreted. Keep up your water intake as discussed, avoid sodas, tea, sugary drinks and foods.   Diabetic urine is normal.

## 2024-12-25 DIAGNOSIS — E11.9 CONTROLLED TYPE 2 DIABETES MELLITUS WITHOUT COMPLICATION, WITHOUT LONG-TERM CURRENT USE OF INSULIN: ICD-10-CM

## 2024-12-26 RX ORDER — EMPAGLIFLOZIN 10 MG/1
10 TABLET, FILM COATED ORAL
Qty: 90 TABLET | Refills: 1 | Status: SHIPPED | OUTPATIENT
Start: 2024-12-26

## 2025-01-30 ENCOUNTER — LAB VISIT (OUTPATIENT)
Dept: LAB | Facility: HOSPITAL | Age: 55
End: 2025-01-30
Payer: COMMERCIAL

## 2025-01-30 DIAGNOSIS — Z00.00 WELLNESS EXAMINATION: ICD-10-CM

## 2025-01-30 LAB
BASOPHILS # BLD AUTO: 0.04 X10(3)/MCL
BASOPHILS NFR BLD AUTO: 0.4 %
EOSINOPHIL # BLD AUTO: 0.28 X10(3)/MCL (ref 0–0.9)
EOSINOPHIL NFR BLD AUTO: 3.1 %
ERYTHROCYTE [DISTWIDTH] IN BLOOD BY AUTOMATED COUNT: 13.1 % (ref 11.5–17)
HCT VFR BLD AUTO: 45 % (ref 37–47)
HGB BLD-MCNC: 14.4 G/DL (ref 12–16)
IMM GRANULOCYTES # BLD AUTO: 0.02 X10(3)/MCL (ref 0–0.04)
IMM GRANULOCYTES NFR BLD AUTO: 0.2 %
LYMPHOCYTES # BLD AUTO: 2.84 X10(3)/MCL (ref 0.6–4.6)
LYMPHOCYTES NFR BLD AUTO: 31.6 %
MCH RBC QN AUTO: 28.1 PG (ref 27–31)
MCHC RBC AUTO-ENTMCNC: 32 G/DL (ref 33–36)
MCV RBC AUTO: 87.7 FL (ref 80–94)
MONOCYTES # BLD AUTO: 0.54 X10(3)/MCL (ref 0.1–1.3)
MONOCYTES NFR BLD AUTO: 6 %
NEUTROPHILS # BLD AUTO: 5.26 X10(3)/MCL (ref 2.1–9.2)
NEUTROPHILS NFR BLD AUTO: 58.7 %
NRBC BLD AUTO-RTO: 0 %
PLATELET # BLD AUTO: 265 X10(3)/MCL (ref 130–400)
PMV BLD AUTO: 12.1 FL (ref 7.4–10.4)
RBC # BLD AUTO: 5.13 X10(6)/MCL (ref 4.2–5.4)
TSH SERPL-ACNC: 1.05 UIU/ML (ref 0.35–4.94)
WBC # BLD AUTO: 8.98 X10(3)/MCL (ref 4.5–11.5)

## 2025-01-30 PROCEDURE — 85025 COMPLETE CBC W/AUTO DIFF WBC: CPT

## 2025-01-30 PROCEDURE — 36415 COLL VENOUS BLD VENIPUNCTURE: CPT

## 2025-01-30 PROCEDURE — 84443 ASSAY THYROID STIM HORMONE: CPT

## 2025-01-31 ENCOUNTER — PATIENT MESSAGE (OUTPATIENT)
Dept: FAMILY MEDICINE | Facility: CLINIC | Age: 55
End: 2025-01-31
Payer: COMMERCIAL

## 2025-03-05 ENCOUNTER — OFFICE VISIT (OUTPATIENT)
Dept: URGENT CARE | Facility: CLINIC | Age: 55
End: 2025-03-05
Payer: COMMERCIAL

## 2025-03-05 VITALS
HEIGHT: 61 IN | WEIGHT: 150 LBS | HEART RATE: 77 BPM | DIASTOLIC BLOOD PRESSURE: 79 MMHG | BODY MASS INDEX: 28.32 KG/M2 | OXYGEN SATURATION: 100 % | SYSTOLIC BLOOD PRESSURE: 115 MMHG | RESPIRATION RATE: 17 BRPM | TEMPERATURE: 98 F

## 2025-03-05 DIAGNOSIS — R05.1 ACUTE COUGH: ICD-10-CM

## 2025-03-05 DIAGNOSIS — J40 BRONCHITIS: Primary | ICD-10-CM

## 2025-03-05 PROCEDURE — 99213 OFFICE O/P EST LOW 20 MIN: CPT | Mod: ,,, | Performed by: NURSE PRACTITIONER

## 2025-03-05 RX ORDER — PROMETHAZINE HYDROCHLORIDE AND DEXTROMETHORPHAN HYDROBROMIDE 6.25; 15 MG/5ML; MG/5ML
5 SYRUP ORAL EVERY 4 HOURS PRN
Qty: 118 ML | Refills: 0 | Status: SHIPPED | OUTPATIENT
Start: 2025-03-05 | End: 2025-03-15

## 2025-03-05 NOTE — PATIENT INSTRUCTIONS
Mucinex OTC as directed.  Or prescription promethazine DM preferably at night for cough  Increase oral fluids  Ibuprofen or Tylenol as directed for pain or fever  Please follow up with your primary care provider within 2-5 days if your signs and symptoms have not resolved or worsen.

## 2025-03-05 NOTE — PROGRESS NOTES
"Subjective:      Patient ID: Zonia Washington is a 54 y.o. female.    Vitals:  height is 5' 1" (1.549 m) and weight is 68 kg (150 lb). Her temperature is 97.9 °F (36.6 °C). Her blood pressure is 115/79 and her pulse is 77. Her respiration is 17 and oxygen saturation is 100%.     Chief Complaint: Cough     Patient is a 54 y.o. female who presents to urgent care with complaints of productive cough with PND x 3-4 day onset. Attributes to possible allergies, cough worse during evening hours, resulting sleep pattern disturbances. Alleviating factors include isabel seltzer without noticeable relief. Patient denies chest congestion, sob upon exertion, n/v/d, fever.      Cough  Pertinent negatives include no fever or shortness of breath.     Constitution: Negative for fever.   Respiratory:  Positive for cough and sputum production. Negative for shortness of breath.       Objective:     Physical Exam   Constitutional: She is oriented to person, place, and time. She appears well-developed. She is cooperative.  Non-toxic appearance. She does not appear ill. No distress.   HENT:   Head: Normocephalic and atraumatic.   Ears:   Right Ear: Hearing, tympanic membrane, external ear and ear canal normal.   Left Ear: Hearing, tympanic membrane, external ear and ear canal normal.   Nose: Nose normal. No mucosal edema, rhinorrhea or nasal deformity. No epistaxis. Right sinus exhibits no maxillary sinus tenderness and no frontal sinus tenderness. Left sinus exhibits no maxillary sinus tenderness and no frontal sinus tenderness.   Mouth/Throat: Uvula is midline, oropharynx is clear and moist and mucous membranes are normal. No trismus in the jaw. Normal dentition. No uvula swelling. No oropharyngeal exudate, posterior oropharyngeal edema or posterior oropharyngeal erythema.   Eyes: Conjunctivae and lids are normal. No scleral icterus.   Neck: Trachea normal and phonation normal. Neck supple. No edema present. No erythema present. No neck " rigidity present.   Cardiovascular: Normal rate, regular rhythm, normal heart sounds and normal pulses.   Pulmonary/Chest: Effort normal and breath sounds normal. No respiratory distress. She has no decreased breath sounds. She has no rhonchi.   Abdominal: Normal appearance.   Musculoskeletal: Normal range of motion.         General: No deformity. Normal range of motion.   Neurological: She is alert and oriented to person, place, and time. She exhibits normal muscle tone. Coordination normal.   Skin: Skin is warm, dry, intact, not diaphoretic and not pale.   Psychiatric: Her speech is normal and behavior is normal. Judgment and thought content normal.   Nursing note and vitals reviewed.    Previous History:     Review of patient's allergies indicates:   Allergen Reactions    Latex Hives, Itching and Rash       Past Medical History:   Diagnosis Date    Mixed hyperlipidemia 10/31/2023    Obesity, unspecified     Osteoarthritis of spine with radiculopathy, cervical region     Psoriasis 05/29/2022    Type 2 diabetes mellitus without complications      Current Outpatient Medications   Medication Instructions    aspirin (ECOTRIN) 81 mg    blood sugar diagnostic Strp To check BG 2 times daily, to use with insurance preferred meter. Adjust volume according to insurance.    blood-glucose meter kit To check BG 2 times daily, to use with insurance preferred meter    cetirizine (ZYRTEC) 10 mg    cholecalciferol, vitamin D3, (VITAMIN D3) 10 mcg (400 unit) Tab No dose, route, or frequency recorded.    diclofenac sodium (VOLTAREN) 2 g, Topical (Top), 4 times daily PRN    JARDIANCE 10 mg, Oral    lancets Misc To check BG 2 times daily, to use with insurance preferred meter Adjust volume according to insurance.    multivit with min-folic acid (ONE-A-DAY WOMEN'S 50 PLUS) 0.4 mg Tab No dose, route, or frequency recorded.    promethazine-dextromethorphan (PROMETHAZINE-DM) 6.25-15 mg/5 mL Syrp 5 mLs, Oral, Every 4 hours PRN     rosuvastatin (CRESTOR) 5 mg, Oral, Nightly    triamcinolone acetonide 0.1% (KENALOG) 0.1 % cream Topical (Top), 2 times daily     Past Surgical History:   Procedure Laterality Date    CHOLECYSTECTOMY      DILATION AND CURETTAGE OF UTERUS      EPIDURAL STEROID INJECTION INTO CERVICAL SPINE N/A 8/19/2024    Procedure: INJECTION, STEROID, SPINE, CERVICAL, EPIDURAL  ///C7-T1;  Surgeon: Chloe Esquivel MD;  Location: [facility] OR;  Service: Pain Management;  Laterality: N/A;  CA C7-T1    HERNIA REPAIR      HERNIA REPAIR      HYSTERECTOMY      MYOMECTOMY       Family History   Problem Relation Name Age of Onset    Diabetes Mother Niecee Felix     Cancer Mother Niecee Felix     Cancer Father Carlos Murphy        Social History[1]  Assessment:     1. Bronchitis    2. Acute cough        Plan:      Mucinex OTC as directed.  Or prescription promethazine DM preferably at night for cough  Increase oral fluids  Ibuprofen or Tylenol as directed for pain or fever  Please follow up with your primary care provider within 2-5 days if your signs and symptoms have not resolved or worsen.      Bronchitis    Acute cough  -     promethazine-dextromethorphan (PROMETHAZINE-DM) 6.25-15 mg/5 mL Syrp; Take 5 mLs by mouth every 4 (four) hours as needed (cough).  Dispense: 118 mL; Refill: 0                         [1]   Social History  Tobacco Use    Smoking status: Never    Smokeless tobacco: Never   Substance Use Topics    Alcohol use: Yes     Comment: Occasionally    Drug use: Never

## 2025-04-04 ENCOUNTER — LAB REQUISITION (OUTPATIENT)
Dept: LAB | Facility: HOSPITAL | Age: 55
End: 2025-04-04
Payer: COMMERCIAL

## 2025-04-04 DIAGNOSIS — L08.9 LOCAL INFECTION OF THE SKIN AND SUBCUTANEOUS TISSUE, UNSPECIFIED: ICD-10-CM

## 2025-04-04 LAB
GRAM STN SPEC: NORMAL
GRAM STN SPEC: NORMAL

## 2025-04-04 PROCEDURE — 87075 CULTR BACTERIA EXCEPT BLOOD: CPT

## 2025-04-04 PROCEDURE — 87205 SMEAR GRAM STAIN: CPT

## 2025-04-04 PROCEDURE — 87102 FUNGUS ISOLATION CULTURE: CPT

## 2025-04-04 PROCEDURE — 87184 SC STD DISK METHOD PER PLATE: CPT

## 2025-04-08 LAB
BACTERIA SPEC ANAEROBE CULT: ABNORMAL
BACTERIA WND CULT: ABNORMAL
BACTERIA WND CULT: ABNORMAL

## 2025-06-10 ENCOUNTER — LAB VISIT (OUTPATIENT)
Dept: LAB | Facility: HOSPITAL | Age: 55
End: 2025-06-10
Attending: FAMILY MEDICINE
Payer: COMMERCIAL

## 2025-06-10 ENCOUNTER — RESULTS FOLLOW-UP (OUTPATIENT)
Dept: FAMILY MEDICINE | Facility: CLINIC | Age: 55
End: 2025-06-10

## 2025-06-10 ENCOUNTER — PATIENT MESSAGE (OUTPATIENT)
Dept: FAMILY MEDICINE | Facility: CLINIC | Age: 55
End: 2025-06-10

## 2025-06-10 ENCOUNTER — OFFICE VISIT (OUTPATIENT)
Dept: FAMILY MEDICINE | Facility: CLINIC | Age: 55
End: 2025-06-10
Payer: COMMERCIAL

## 2025-06-10 VITALS
SYSTOLIC BLOOD PRESSURE: 117 MMHG | TEMPERATURE: 98 F | DIASTOLIC BLOOD PRESSURE: 74 MMHG | HEART RATE: 69 BPM | HEIGHT: 61 IN | BODY MASS INDEX: 28.51 KG/M2 | RESPIRATION RATE: 16 BRPM | WEIGHT: 151 LBS | OXYGEN SATURATION: 99 %

## 2025-06-10 DIAGNOSIS — E11.69 HYPERLIPIDEMIA ASSOCIATED WITH TYPE 2 DIABETES MELLITUS: ICD-10-CM

## 2025-06-10 DIAGNOSIS — E78.5 HYPERLIPIDEMIA ASSOCIATED WITH TYPE 2 DIABETES MELLITUS: ICD-10-CM

## 2025-06-10 DIAGNOSIS — E11.9 CONTROLLED TYPE 2 DIABETES MELLITUS WITHOUT COMPLICATION, WITHOUT LONG-TERM CURRENT USE OF INSULIN: Primary | ICD-10-CM

## 2025-06-10 DIAGNOSIS — Z23 NEED FOR PNEUMOCOCCAL 20-VALENT CONJUGATE VACCINATION: ICD-10-CM

## 2025-06-10 DIAGNOSIS — E11.9 CONTROLLED TYPE 2 DIABETES MELLITUS WITHOUT COMPLICATION, WITHOUT LONG-TERM CURRENT USE OF INSULIN: ICD-10-CM

## 2025-06-10 LAB
ALBUMIN SERPL-MCNC: 3.8 G/DL (ref 3.5–5)
ALBUMIN/GLOB SERPL: 1 RATIO (ref 1.1–2)
ALP SERPL-CCNC: 128 UNIT/L (ref 40–150)
ALT SERPL-CCNC: 20 UNIT/L (ref 0–55)
ANION GAP SERPL CALC-SCNC: 7 MEQ/L
AST SERPL-CCNC: 15 UNIT/L (ref 11–45)
BILIRUB SERPL-MCNC: 0.4 MG/DL
BUN SERPL-MCNC: 11.4 MG/DL (ref 9.8–20.1)
CALCIUM SERPL-MCNC: 9.4 MG/DL (ref 8.4–10.2)
CHLORIDE SERPL-SCNC: 109 MMOL/L (ref 98–107)
CHOLEST SERPL-MCNC: 127 MG/DL
CHOLEST/HDLC SERPL: 2 {RATIO} (ref 0–5)
CK SERPL-CCNC: 57 U/L (ref 29–168)
CO2 SERPL-SCNC: 28 MMOL/L (ref 22–29)
CREAT SERPL-MCNC: 0.78 MG/DL (ref 0.55–1.02)
CREAT/UREA NIT SERPL: 15
EST. AVERAGE GLUCOSE BLD GHB EST-MCNC: 134.1 MG/DL
GFR SERPLBLD CREATININE-BSD FMLA CKD-EPI: >60 ML/MIN/1.73/M2
GLOBULIN SER-MCNC: 4 GM/DL (ref 2.4–3.5)
GLUCOSE SERPL-MCNC: 99 MG/DL (ref 74–100)
HBA1C MFR BLD: 6.3 %
HDLC SERPL-MCNC: 60 MG/DL (ref 35–60)
LDLC SERPL CALC-MCNC: 59 MG/DL (ref 50–140)
POTASSIUM SERPL-SCNC: 4.7 MMOL/L (ref 3.5–5.1)
PROT SERPL-MCNC: 7.8 GM/DL (ref 6.4–8.3)
SODIUM SERPL-SCNC: 144 MMOL/L (ref 136–145)
TRIGL SERPL-MCNC: 39 MG/DL (ref 37–140)
VLDLC SERPL CALC-MCNC: 8 MG/DL

## 2025-06-10 PROCEDURE — 3074F SYST BP LT 130 MM HG: CPT | Mod: CPTII,,, | Performed by: FAMILY MEDICINE

## 2025-06-10 PROCEDURE — 1159F MED LIST DOCD IN RCRD: CPT | Mod: CPTII,,, | Performed by: FAMILY MEDICINE

## 2025-06-10 PROCEDURE — 80053 COMPREHEN METABOLIC PANEL: CPT

## 2025-06-10 PROCEDURE — 80061 LIPID PANEL: CPT

## 2025-06-10 PROCEDURE — 3008F BODY MASS INDEX DOCD: CPT | Mod: CPTII,,, | Performed by: FAMILY MEDICINE

## 2025-06-10 PROCEDURE — 99214 OFFICE O/P EST MOD 30 MIN: CPT | Mod: ,,, | Performed by: FAMILY MEDICINE

## 2025-06-10 PROCEDURE — 1160F RVW MEDS BY RX/DR IN RCRD: CPT | Mod: CPTII,,, | Performed by: FAMILY MEDICINE

## 2025-06-10 PROCEDURE — 82550 ASSAY OF CK (CPK): CPT

## 2025-06-10 PROCEDURE — G2211 COMPLEX E/M VISIT ADD ON: HCPCS | Mod: ,,, | Performed by: FAMILY MEDICINE

## 2025-06-10 PROCEDURE — 36415 COLL VENOUS BLD VENIPUNCTURE: CPT

## 2025-06-10 PROCEDURE — 83036 HEMOGLOBIN GLYCOSYLATED A1C: CPT

## 2025-06-10 PROCEDURE — 3078F DIAST BP <80 MM HG: CPT | Mod: CPTII,,, | Performed by: FAMILY MEDICINE

## 2025-06-10 RX ORDER — ROSUVASTATIN CALCIUM 5 MG/1
5 TABLET, COATED ORAL NIGHTLY
Qty: 90 TABLET | Refills: 3 | Status: SHIPPED | OUTPATIENT
Start: 2025-06-10 | End: 2026-06-10

## 2025-06-10 RX ORDER — GENTAMICIN SULFATE 1 MG/G
0.1 OINTMENT TOPICAL 3 TIMES DAILY
COMMUNITY
Start: 2025-05-23

## 2025-06-10 RX ORDER — METHOTREXATE 2.5 MG/1
2.5 TABLET ORAL
COMMUNITY
Start: 2025-05-21

## 2025-06-10 RX ORDER — CLOBETASOL PROPIONATE 0.5 MG/G
0.05 OINTMENT TOPICAL 2 TIMES DAILY
COMMUNITY
Start: 2025-05-20

## 2025-06-10 RX ORDER — FOLIC ACID 1 MG/1
1000 TABLET ORAL DAILY
COMMUNITY
Start: 2025-05-14

## 2025-06-10 NOTE — PROGRESS NOTES
Patient ID: [id number]     Chief Complaint: Diabetes and Hyperlipidemia        HPI:     Zonia Washington is a 54 y.o. female here today for a follow up DM II, HLD.  - DM II is controlled with diet and Jardiance, no side effects, asymptomatic, she wears eyeglasses, last eye exam was 10/22/2024, she does see Dr. Marvin as well. She is due for labs.  Fasting CBGs have been 110's. She does see Podiatry (Dr. Lutz) for DM II foot care and Dermatology (Dr. Mosqueda) for psoriasis treatment, trying different injections, currently on Methotrexate and Clobetasol ointment.    - HLD is controlled with Rx, no side effects, asymptomatic, she needs a refill of Crestor today.  She is due for repeat labs.   - Patient is without any other complaints today.         -------------------------------------    Mixed hyperlipidemia    Obesity, unspecified    Osteoarthritis of spine with radiculopathy, cervical region    Psoriasis    Type 2 diabetes mellitus without complications        Past Surgical History:   Procedure Laterality Date    CHOLECYSTECTOMY      DILATION AND CURETTAGE OF UTERUS      EPIDURAL STEROID INJECTION INTO CERVICAL SPINE N/A 8/19/2024    Procedure: INJECTION, STEROID, SPINE, CERVICAL, EPIDURAL  ///C7-T1;  Surgeon: Chloe Esquivel MD;  Location: [facility];  Service: Pain Management;  Laterality: N/A;  CA C7-T1    HERNIA REPAIR      HERNIA REPAIR      HYSTERECTOMY      MYOMECTOMY         Review of patient's allergies indicates:   Allergen Reactions    Latex Hives, Itching and Rash       Outpatient Medications Marked as Taking for the 6/10/25 encounter (Office Visit) with Zara Fairbanks MD   Medication Sig Dispense Refill    aspirin (ECOTRIN) 81 MG EC tablet Take 81 mg by mouth.      blood sugar diagnostic Strp To check BG 2 times daily, to use with insurance preferred meter. Adjust volume according to insurance. 100 strip 11    blood-glucose meter kit To check BG 2 times daily, to use with insurance  preferred meter 1 each 0    cetirizine (ZYRTEC) 10 MG tablet Take 10 mg by mouth.      cholecalciferol, vitamin D3, (VITAMIN D3) 10 mcg (400 unit) Tab       clobetasol 0.05% (TEMOVATE) 0.05 % Oint Apply 0.05 % topically 2 (two) times daily.      folic acid (FOLVITE) 1 MG tablet Take 1,000 mcg by mouth once daily.      gentamicin (GARAMYCIN) 0.1 % ointment Apply 0.1 g topically 3 (three) times daily.      lancets Misc To check BG 2 times daily, to use with insurance preferred meter Adjust volume according to insurance. 100 each 11    methotrexate 2.5 MG Tab Take 2.5 mg by mouth every 7 days.      multivit with min-folic acid (ONE-A-DAY WOMEN'S 50 PLUS) 0.4 mg Tab       [DISCONTINUED] empagliflozin (JARDIANCE) 10 mg tablet TAKE 1 TABLET(10 MG) BY MOUTH DAILY 90 tablet 1       Social History[1]     Family History   Problem Relation Name Age of Onset    Diabetes Mother Nieceviraj Washington     Cancer Mother Gaileceviraj Washington     Cancer Father Carlos Murphy         Subjective:       Review of Systems:    See HPI for details    Constitutional: Denies Change in appetite. Denies Chills. Denies Fever. Denies Night sweats.  Eye: Denies Blurred vision. Denies Discharge. Denies Eye pain.  ENT: Denies Decreased hearing. Denies Sore throat. Denies Swollen glands.  Respiratory: Denies Cough. Denies Shortness of breath. Denies Shortness of breath with exertion. Denies Wheezing.  Cardiovascular: Denies Chest pain at rest. Denies Chest pain with exertion. Denies Irregular heartbeat. Denies Palpitations.  Gastrointestinal: Denies Abdominal pain. Denies Diarrhea. Denies Nausea. Denies Vomiting. Denies Hematemesis or Hematochezia.  Genitourinary: Denies Dysuria. Denies Urinary frequency. Denies Urinary urgency. Denies Blood in urine.  Endocrine: Denies Cold intolerance. Denies Excessive thirst. Denies Heat intolerance. Denies Weight loss. Denies Weight gain.  Musculoskeletal: Denies Painful joints. Denies Weakness.  Integumentary: Denies Rash. Denies  "Itching. Denies Dry skin.  Neurologic: Denies Dizziness. Denies Fainting. Denies Headache.  Psychiatric: Denies Depression. Denies Anxiety. Denies Suicidal/Homicidal ideations.    All Other ROS: Negative except as stated in HPI.       Objective:     /74 (BP Location: Right arm, Patient Position: Sitting)   Pulse 69   Temp 97.7 °F (36.5 °C) (Oral)   Resp 16   Ht 5' 1" (1.549 m)   Wt 68.5 kg (151 lb)   SpO2 99%   BMI 28.53 kg/m²     Physical Exam    General: Alert and oriented, No acute distress. Overweight.   Head: Normocephalic, Atraumatic.  Eye: Pupils are equal, round and reactive to light, Extraocular movements are intact, Sclera non-icteric.  Ears/Nose/Throat: Normal, Mucosa moist,Clear.  Neck/Thyroid: Supple, Non-tender, No carotid bruit, No palpable thyromegaly or thyroid nodule, No lymphadenopathy, No JVD, Full range of motion.  Respiratory: Clear to auscultation bilaterally; No wheezes, rales or rhonchi,Non-labored respirations, Symmetrical chest wall expansion.  Cardiovascular: Regular rate and rhythm, S1/S2 normal, No murmurs, rubs or gallops.  Gastrointestinal: Soft, Non-tender, Non-distended, Normal bowel sounds, No palpable organomegaly.  Musculoskeletal: Normal range of motion.  Integumentary: Warm, Dry, Intact, No suspicious lesions or rashes.  Extremities: No clubbing, cyanosis or edema  Neurologic: No focal deficits, Cranial Nerves II-XII are grossly intact, Motor strength normal upper and lower extremities, Sensory exam intact.  Psychiatric: Normal interaction, Coherent speech, Euthymic mood, Appropriate affect         Assessment:       ICD-10-CM ICD-9-CM   1. Controlled type 2 diabetes mellitus without complication, without long-term current use of insulin  E11.9 250.00   2. Hyperlipidemia associated with type 2 diabetes mellitus  E11.69 250.80    E78.5 272.4   3. Need for pneumococcal 20-valent conjugate vaccination  Z23 V03.82        Plan:     Problem List Items Addressed This Visit "          Cardiac/Vascular    Hyperlipidemia associated with type 2 diabetes mellitus    Relevant Medications    rosuvastatin (CRESTOR) 5 MG tablet    Other Relevant Orders    Comprehensive Metabolic Panel    Lipid Panel    CK       Endocrine    Controlled type 2 diabetes mellitus without complication, without long-term current use of insulin - Primary    Relevant Medications    empagliflozin (JARDIANCE) 10 mg tablet    Other Relevant Orders    Comprehensive Metabolic Panel    Hemoglobin A1C     Other Visit Diagnoses         Need for pneumococcal 20-valent conjugate vaccination        Relevant Medications    pneumoc 20-penny conj-dip cr,PF, (PREVNAR-20, PF,) 0.5 mL Syrg injection         1. Controlled type 2 diabetes mellitus without complication, without long-term current use of insulin  - empagliflozin (JARDIANCE) 10 mg tablet; Take 1 tablet (10 mg total) by mouth once daily.  Dispense: 90 tablet; Refill: 3  - Comprehensive Metabolic Panel; Future  - Hemoglobin A1C; Future  - Continue Jardiance, will treat pending results. Keep daily fasting CBG log. Keep appointment for annual eye exam as scheduled. Notify M.D. or ER if CBG >400 or <60, polyuria, polydipsia, or polyphagia.   Lab Results   Component Value Date    HGBA1C 6.9 11/15/2024      Continue   On Statin according to guidelines.  Follow ADA Diet. Avoid soda, simple sweets, and limit rice/pasta/breads/starches.  Maintain healthy weight with goal BMI <30.  Exercise 5 times per week for 30 minutes per day.  Stressed importance of daily foot exams.  Stressed importance of annual dilated eye exam.     2. Hyperlipidemia associated with type 2 diabetes mellitus  - rosuvastatin (CRESTOR) 5 MG tablet; Take 1 tablet (5 mg total) by mouth nightly.  Dispense: 90 tablet; Refill: 3  - Comprehensive Metabolic Panel; Future  - Lipid Panel; Future  - CK; Future  - Continue Crestor, will treat pending results.   Continue  Stressed importance of dietary modifications. Follow a  low cholesterol, low saturated fat diet with less that 200mg of cholesterol a day.  Avoid fried foods and high saturated fats (high saturated fats less than 7% of calories).  Add Flax Seed/Fish Oil supplements to diet. Increase dietary fiber.  Regular exercise can reduce LDL and raise HDL. Stressed importance of physical activity 5 times per week for 30 minutes per day.      3. Need for pneumococcal 20-valent conjugate vaccination  - Written Rx for pneumoc 20-orlando conj-dip cr,PF, (PREVNAR-20, PF,) 0.5 mL Syrg injection; Inject 0.5 mLs into the muscle once. for 1 dose  Dispense: 0.5 mL; Refill: 0 printed for patient to bring to local pharmacy.         Zonia was seen today for diabetes and hyperlipidemia.    Diagnoses and all orders for this visit:    Controlled type 2 diabetes mellitus without complication, without long-term current use of insulin  -     empagliflozin (JARDIANCE) 10 mg tablet; Take 1 tablet (10 mg total) by mouth once daily.  -     Comprehensive Metabolic Panel; Future  -     Hemoglobin A1C; Future    Hyperlipidemia associated with type 2 diabetes mellitus  -     rosuvastatin (CRESTOR) 5 MG tablet; Take 1 tablet (5 mg total) by mouth nightly.  -     Comprehensive Metabolic Panel; Future  -     Lipid Panel; Future  -     CK; Future    Need for pneumococcal 20-valent conjugate vaccination  -     pneumoc 20-orlando conj-dip cr,PF, (PREVNAR-20, PF,) 0.5 mL Syrg injection; Inject 0.5 mLs into the muscle once. for 1 dose          Medication List with Changes/Refills   New Medications    PNEUMOC 20-[place] CONJ-DIP CR,PF, (PREVNAR-20, PF,) 0.5 ML SYRG INJECTION    Inject 0.5 mLs into the muscle once. for 1 dose       Start Date: 6/10/2025 End Date: 6/10/2025   Current Medications    ASPIRIN (ECOTRIN) 81 MG EC TABLET    Take 81 mg by mouth.       Start Date: 10/4/2021 End Date: --    BLOOD SUGAR DIAGNOSTIC STRP    To check BG 2 times daily, to use with insurance preferred meter. Adjust volume according to  insurance.       Start Date: 11/18/2024End Date: --    BLOOD-GLUCOSE METER KIT    To check BG 2 times daily, to use with insurance preferred meter       Start Date: 11/18/2024End Date: --    CETIRIZINE (ZYRTEC) 10 MG TABLET    Take 10 mg by mouth.       Start Date: 10/4/2021 End Date: --    CHOLECALCIFEROL, VITAMIN D3, (VITAMIN D3) 10 MCG (400 UNIT) TAB           Start Date: 9/9/2023  End Date: --    CLOBETASOL 0.05% (TEMOVATE) 0.05 % OINT    Apply 0.05 % topically 2 (two) times daily.       Start Date: 5/20/2025 End Date: --    FOLIC ACID (FOLVITE) 1 MG TABLET    Take 1,000 mcg by mouth once daily.       Start Date: 5/14/2025 End Date: --    GENTAMICIN (GARAMYCIN) 0.1 % OINTMENT    Apply 0.1 g topically 3 (three) times daily.       Start Date: 5/23/2025 End Date: --    LANCETS MISC    To check BG 2 times daily, to use with insurance preferred meter Adjust volume according to insurance.       Start Date: 11/18/2024End Date: --    METHOTREXATE 2.5 MG TAB    Take 2.5 mg by mouth every 7 days.       Start Date: 5/21/2025 End Date: --    MULTIVIT WITH MIN-FOLIC ACID (ONE-A-DAY WOMEN'S 50 PLUS) 0.4 MG TAB           Start Date: 10/6/2023 End Date: --   Changed and/or Refilled Medications    Modified Medication Previous Medication    EMPAGLIFLOZIN (JARDIANCE) 10 MG TABLET empagliflozin (JARDIANCE) 10 mg tablet       Take 1 tablet (10 mg total) by mouth once daily.    TAKE 1 TABLET(10 MG) BY MOUTH DAILY       Start Date: 6/10/2025 End Date: --    Start Date: 12/26/2024End Date: 6/10/2025    ROSUVASTATIN (CRESTOR) 5 MG TABLET rosuvastatin (CRESTOR) 5 MG tablet       Take 1 tablet (5 mg total) by mouth nightly.    Take 1 tablet (5 mg total) by mouth nightly.       Start Date: 6/10/2025 End Date: 6/10/2026    Start Date: 6/4/2024  End Date: 6/10/2025   Discontinued Medications    DICLOFENAC SODIUM (VOLTAREN) 1 % GEL    Apply 2 g topically 4 (four) times daily as needed (pain/inflammation).       Start Date: 12/4/2023 End Date:  6/10/2025    TRIAMCINOLONE ACETONIDE 0.1% (KENALOG) 0.1 % CREAM    Apply topically 2 (two) times daily. for 7 days       Start Date: 8/23/2024 End Date: 6/10/2025          Follow up in about 6 months (around 12/12/2025) for Wellness.        [1]   Social History  Socioeconomic History    Marital status:    Tobacco Use    Smoking status: Never    Smokeless tobacco: Never   Substance and Sexual Activity    Alcohol use: Yes     Comment: Occasionally    Drug use: Never    Sexual activity: Not Currently     Partners: Male     Birth control/protection: None   Social History Narrative    ** Merged History Encounter **          Social Drivers of Health     Financial Resource Strain: Low Risk  (6/5/2025)    Overall Financial Resource Strain (CARDIA)     Difficulty of Paying Living Expenses: Not hard at all   Food Insecurity: No Food Insecurity (6/5/2025)    Hunger Vital Sign     Worried About Running Out of Food in the Last Year: Never true     Ran Out of Food in the Last Year: Never true   Transportation Needs: No Transportation Needs (6/5/2025)    PRAPARE - Transportation     Lack of Transportation (Medical): No     Lack of Transportation (Non-Medical): No   Physical Activity: Sufficiently Active (6/5/2025)    Exercise Vital Sign     Days of Exercise per Week: 7 days     Minutes of Exercise per Session: 30 min   Stress: No Stress Concern Present (6/5/2025)    [employer] - Occupational Stress Questionnaire     Feeling of Stress : Not at all   Housing Stability: Low Risk  (6/5/2025)    Housing Stability Vital Sign     Unable to Pay for Housing in the Last Year: No     Number of Times Moved in the Last Year: 0     Homeless in the Last Year: No

## 2025-08-09 ENCOUNTER — OFFICE VISIT (OUTPATIENT)
Dept: URGENT CARE | Facility: CLINIC | Age: 55
End: 2025-08-09
Payer: COMMERCIAL

## 2025-08-09 VITALS
SYSTOLIC BLOOD PRESSURE: 135 MMHG | BODY MASS INDEX: 28.51 KG/M2 | WEIGHT: 151 LBS | OXYGEN SATURATION: 100 % | DIASTOLIC BLOOD PRESSURE: 81 MMHG | HEIGHT: 61 IN | HEART RATE: 71 BPM | RESPIRATION RATE: 16 BRPM | TEMPERATURE: 98 F

## 2025-08-09 DIAGNOSIS — R30.0 DYSURIA: ICD-10-CM

## 2025-08-09 DIAGNOSIS — Z86.39 HISTORY OF DIABETES MELLITUS: ICD-10-CM

## 2025-08-09 DIAGNOSIS — R31.9 URINARY TRACT INFECTION WITH HEMATURIA, SITE UNSPECIFIED: Primary | ICD-10-CM

## 2025-08-09 DIAGNOSIS — N39.0 URINARY TRACT INFECTION WITH HEMATURIA, SITE UNSPECIFIED: Primary | ICD-10-CM

## 2025-08-09 LAB
BILIRUB UR QL STRIP: NEGATIVE
GLUCOSE SERPL-MCNC: 104 MG/DL (ref 70–110)
GLUCOSE UR QL STRIP: POSITIVE
KETONES UR QL STRIP: NEGATIVE
LEUKOCYTE ESTERASE UR QL STRIP: POSITIVE
PH, POC UA: 6.5
POC BLOOD, URINE: POSITIVE
POC NITRATES, URINE: NEGATIVE
PROT UR QL STRIP: NEGATIVE
SP GR UR STRIP: 1.01 (ref 1–1.03)
UROBILINOGEN UR STRIP-ACNC: ABNORMAL (ref 0.1–1.1)

## 2025-08-09 PROCEDURE — 99214 OFFICE O/P EST MOD 30 MIN: CPT | Mod: 25,,, | Performed by: NURSE PRACTITIONER

## 2025-08-09 PROCEDURE — 81003 URINALYSIS AUTO W/O SCOPE: CPT | Mod: QW,,, | Performed by: NURSE PRACTITIONER

## 2025-08-09 PROCEDURE — 82962 GLUCOSE BLOOD TEST: CPT | Mod: ,,, | Performed by: NURSE PRACTITIONER

## 2025-08-09 PROCEDURE — 96372 THER/PROPH/DIAG INJ SC/IM: CPT | Mod: ,,, | Performed by: NURSE PRACTITIONER

## 2025-08-09 RX ORDER — PHENAZOPYRIDINE HYDROCHLORIDE 200 MG/1
200 TABLET, FILM COATED ORAL 3 TIMES DAILY PRN
Qty: 9 TABLET | Refills: 0 | Status: SHIPPED | OUTPATIENT
Start: 2025-08-09 | End: 2025-08-12

## 2025-08-09 RX ORDER — NITROFURANTOIN 25; 75 MG/1; MG/1
100 CAPSULE ORAL 2 TIMES DAILY
Qty: 14 CAPSULE | Refills: 0 | Status: SHIPPED | OUTPATIENT
Start: 2025-08-09 | End: 2025-08-16

## 2025-08-09 RX ORDER — CEFTRIAXONE 1 G/1
1 INJECTION, POWDER, FOR SOLUTION INTRAMUSCULAR; INTRAVENOUS
Status: COMPLETED | OUTPATIENT
Start: 2025-08-09 | End: 2025-08-09

## 2025-08-09 RX ADMIN — CEFTRIAXONE 1 G: 1 INJECTION, POWDER, FOR SOLUTION INTRAMUSCULAR; INTRAVENOUS at 12:08

## 2025-08-09 NOTE — PATIENT INSTRUCTIONS
INSTRUCTIONS:          A urinary tract infection (UTI) is caused by bacteria that get inside your urinary tract. Your urinary tract includes your kidneys, ureters, bladder, and urethra. A UTI is more common in your lower urinary tract, which includes your bladder and urethra.      DISCHARGE INSTRUCTIONS:  Seek care immediately if:    You are urinating very little or not at all.  You have a high fever with shaking chills.  You have side or back pain that gets worse.  Call your doctor if:  You have a fever.  You do not feel better after 2 days of taking antibiotics.  You have new symptoms, such as blood or pus in your urine.  You are vomiting.  You have questions or concerns about your condition or care.    Medicines:  Antibiotics treat a bacterial infection. If you have UTIs often (called recurrent UTIs), you may be given antibiotics to take regularly. You will be given directions for when and how to use antibiotics. The goal is to prevent UTIs but not cause antibiotic resistance by using antibiotics too often.  Medicines may be given to decrease pain and burning when you urinate. They will also help decrease the feeling that you need to urinate often. These medicines may make your urine orange or red.  Take your medicine as directed. Contact your healthcare provider if you think your medicine is not helping or if you have side effects. Tell your provider if you are allergic to any medicine. Keep a list of the medicines, vitamins, and herbs you take. Include the amounts, and when and why you take them. Bring the list or the pill bottles to follow-up visits. Carry your medicine list with you in case of an emergency.  Follow up with your doctor as directed:  Write down your questions so you remember to ask them during your visits.    Prevent another UTI:  Empty your bladder often. Urinate and empty your bladder as soon as you feel the need. Do not hold your urine for long periods of time.  Wipe from front to back after  you urinate or have a bowel movement. This will help prevent germs from getting into your urinary tract through your urethra.  Drink liquids as directed. Ask how much liquid to drink each day and which liquids are best for you. You may need to drink more liquids than usual to help flush out the bacteria. Do not drink alcohol, caffeine, or citrus juices. These can irritate your bladder and increase your symptoms. Your healthcare provider may recommend cranberry juice to help prevent a UTI.  Urinate before and after you have sex. This can help flush out bacteria passed during sex.  Do not douche or use feminine deodorants. These can change the chemical balance in your vagina.  Change sanitary pads or tampons often. This will help prevent germs from getting into your urinary tract.  Talk to your healthcare provider about your birth control method. You may need to change your method if it is increasing your risk for UTIs.  Wear cotton underwear and clothes that are loose. Tight pants and nylon underwear can trap moisture and cause bacteria to grow.  Vaginal estrogen may be recommended. This medicine helps prevent UTIs in women who have gone through menopause or are in yimi-menopause.  Do pelvic muscle exercises often. Pelvic muscle exercises may help you start and stop urinating. Strong pelvic muscles may help you empty your bladder easier. Squeeze these muscles tightly for 5 seconds like you are trying to hold back urine. Then relax for 5 seconds. Gradually work up to squeezing for 10 seconds. Do 3 sets of 15 repetitions a day, or as directed.

## 2025-08-09 NOTE — PROGRESS NOTES
"Subjective:      Patient ID: Zonia Washington is a 54 y.o. female.    Vitals:  height is 5' 1" (1.549 m) and weight is 68.5 kg (151 lb). Her oral temperature is 98.4 °F (36.9 °C). Her blood pressure is 135/81 and her pulse is 71. Her respiration is 16 and oxygen saturation is 100%.     Chief Complaint: Dysuria     Patient is a 54 y.o. female who presents to urgent care with complaints of urinary frequency, dysuria x 4 days. Alleviating factors include none. Patient denies nausea, vomiting, diarrhea, back pain, chills, fever.       Constitution: Negative. Negative for fever.   HENT: Negative.     Neck: neck negative.   Cardiovascular: Negative.    Eyes: Negative.    Respiratory: Negative.     Gastrointestinal: Negative.  Negative for abdominal pain.   Endocrine: negative.   Genitourinary:  Positive for dysuria and frequency. Negative for urgency, hematuria and vaginal discharge.   Musculoskeletal: Negative.  Negative for back pain.   Skin: Negative.    Allergic/Immunologic: Negative.    Neurological: Negative.    Hematologic/Lymphatic: Negative.    Psychiatric/Behavioral: Negative.        Objective:     Physical Exam   Constitutional: She is oriented to person, place, and time. She appears well-developed. She is cooperative. She does not appear ill.   HENT:   Head: Normocephalic and atraumatic.   Ears:   Right Ear: Hearing, tympanic membrane, external ear and ear canal normal.   Left Ear: Hearing, tympanic membrane, external ear and ear canal normal.   Nose: Nose normal. No mucosal edema or nasal deformity. No epistaxis. Right sinus exhibits no maxillary sinus tenderness and no frontal sinus tenderness. Left sinus exhibits no maxillary sinus tenderness and no frontal sinus tenderness.   Mouth/Throat: Uvula is midline, oropharynx is clear and moist and mucous membranes are normal. Mucous membranes are moist. No trismus in the jaw. Normal dentition. No uvula swelling.   Eyes: Conjunctivae and lids are normal. "   Neck: Trachea normal and phonation normal. Neck supple.   Cardiovascular: Normal rate, regular rhythm, normal heart sounds and normal pulses.   Pulmonary/Chest: Effort normal and breath sounds normal.   Abdominal: Normal appearance and bowel sounds are normal. Soft. There is no abdominal tenderness. There is no rebound, no guarding, no left CVA tenderness and no right CVA tenderness.   Musculoskeletal: Normal range of motion.         General: Normal range of motion.   Lymphadenopathy:     She has no cervical adenopathy.   Neurological: She is alert and oriented to person, place, and time. She exhibits normal muscle tone.   Skin: Skin is warm, dry and intact.   Psychiatric: Her speech is normal and behavior is normal. Judgment and thought content normal.   Nursing note and vitals reviewed.         Previous History      Review of patient's allergies indicates:   Allergen Reactions    Latex Hives, Itching and Rash       Past Medical History:   Diagnosis Date    Mixed hyperlipidemia 10/31/2023    Obesity, unspecified     Osteoarthritis of spine with radiculopathy, cervical region     Psoriasis 05/29/2022    Type 2 diabetes mellitus without complications      Current Outpatient Medications   Medication Instructions    aspirin (ECOTRIN) 81 mg    blood sugar diagnostic Strp To check BG 2 times daily, to use with insurance preferred meter. Adjust volume according to insurance.    blood-glucose meter kit To check BG 2 times daily, to use with insurance preferred meter    cetirizine (ZYRTEC) 10 mg    cholecalciferol, vitamin D3, (VITAMIN D3) 10 mcg (400 unit) Tab No dose, route, or frequency recorded.    clobetasol 0.05% (TEMOVATE) 0.05 %, 2 times daily    empagliflozin (JARDIANCE) 10 mg, Oral, Daily    folic acid (FOLVITE) 1,000 mcg, Daily    gentamicin (GARAMYCIN) 0.1 g, 3 times daily    lancets Misc To check BG 2 times daily, to use with insurance preferred meter Adjust volume according to insurance.    methotrexate 2.5  "mg, Every 7 days    multivit with min-folic acid (ONE-A-DAY WOMEN'S 50 PLUS) 0.4 mg Tab No dose, route, or frequency recorded.    nitrofurantoin, macrocrystal-monohydrate, (MACROBID) 100 MG capsule 100 mg, Oral, 2 times daily    phenazopyridine (PYRIDIUM) 200 mg, Oral, 3 times daily PRN    rosuvastatin (CRESTOR) 5 mg, Oral, Nightly     Past Surgical History:   Procedure Laterality Date    CHOLECYSTECTOMY      DILATION AND CURETTAGE OF UTERUS      EPIDURAL STEROID INJECTION INTO CERVICAL SPINE N/A 8/19/2024    Procedure: INJECTION, STEROID, SPINE, CERVICAL, EPIDURAL  ///C7-T1;  Surgeon: Chloe Esquivel MD;  Location: [facility] OR;  Service: Pain Management;  Laterality: N/A;  CA C7-T1    HERNIA REPAIR      HERNIA REPAIR      HYSTERECTOMY      MYOMECTOMY       Family History   Problem Relation Name Age of Onset    Diabetes Mother Niecee Felix     Cancer Mother Niecee Felix     Cancer Father Carlos Murphy        Social History[1]     Physical Exam      Vital Signs Reviewed   /81   Pulse 71   Temp 98.4 °F (36.9 °C) (Oral)   Resp 16   Ht 5' 1" (1.549 m)   Wt 68.5 kg (151 lb)   SpO2 100%   BMI 28.53 kg/m²        Procedures    Procedures     Labs     Results for orders placed or performed in visit on 08/09/25   POCT Urinalysis, Dipstick, Automated, W/O Scope    Collection Time: 08/09/25 11:46 AM   Result Value Ref Range    POC Blood, Urine Positive (A) Negative    POC Bilirubin, Urine Negative Negative    POC Urobilinogen, Urine norm 0.1 - 1.1    POC Ketones, Urine Negative Negative    POC Protein, Urine Negative Negative    POC Nitrates, Urine Negative Negative    POC Glucose, Urine Positive (A) Negative    pH, UA 6.5     POC Specific Gravity, Urine 1.010 1.003 - 1.029    POC Leukocytes, Urine Positive (A) Negative   POCT Glucose, Hand-Held Device    Collection Time: 08/09/25 11:50 AM   Result Value Ref Range    POC Glucose 104 70 - 110 MG/DL     Assessment:     1. Urinary tract infection with hematuria, site " unspecified    2. Dysuria    3. History of diabetes mellitus        Plan:   INSTRUCTIONS:          A urinary tract infection (UTI) is caused by bacteria that get inside your urinary tract. Your urinary tract includes your kidneys, ureters, bladder, and urethra. A UTI is more common in your lower urinary tract, which includes your bladder and urethra.      DISCHARGE INSTRUCTIONS:  Seek care immediately if:    You are urinating very little or not at all.  You have a high fever with shaking chills.  You have side or back pain that gets worse.  Call your doctor if:  You have a fever.  You do not feel better after 2 days of taking antibiotics.  You have new symptoms, such as blood or pus in your urine.  You are vomiting.  You have questions or concerns about your condition or care.    Medicines:  Antibiotics treat a bacterial infection. If you have UTIs often (called recurrent UTIs), you may be given antibiotics to take regularly. You will be given directions for when and how to use antibiotics. The goal is to prevent UTIs but not cause antibiotic resistance by using antibiotics too often.  Medicines may be given to decrease pain and burning when you urinate. They will also help decrease the feeling that you need to urinate often. These medicines may make your urine orange or red.  Take your medicine as directed. Contact your healthcare provider if you think your medicine is not helping or if you have side effects. Tell your provider if you are allergic to any medicine. Keep a list of the medicines, vitamins, and herbs you take. Include the amounts, and when and why you take them. Bring the list or the pill bottles to follow-up visits. Carry your medicine list with you in case of an emergency.  Follow up with your doctor as directed:  Write down your questions so you remember to ask them during your visits.    Prevent another UTI:  Empty your bladder often. Urinate and empty your bladder as soon as you feel the need. Do  not hold your urine for long periods of time.  Wipe from front to back after you urinate or have a bowel movement. This will help prevent germs from getting into your urinary tract through your urethra.  Drink liquids as directed. Ask how much liquid to drink each day and which liquids are best for you. You may need to drink more liquids than usual to help flush out the bacteria. Do not drink alcohol, caffeine, or citrus juices. These can irritate your bladder and increase your symptoms. Your healthcare provider may recommend cranberry juice to help prevent a UTI.  Urinate before and after you have sex. This can help flush out bacteria passed during sex.  Do not douche or use feminine deodorants. These can change the chemical balance in your vagina.  Change sanitary pads or tampons often. This will help prevent germs from getting into your urinary tract.  Talk to your healthcare provider about your birth control method. You may need to change your method if it is increasing your risk for UTIs.  Wear cotton underwear and clothes that are loose. Tight pants and nylon underwear can trap moisture and cause bacteria to grow.  Vaginal estrogen may be recommended. This medicine helps prevent UTIs in women who have gone through menopause or are in yimi-menopause.  Do pelvic muscle exercises often. Pelvic muscle exercises may help you start and stop urinating. Strong pelvic muscles may help you empty your bladder easier. Squeeze these muscles tightly for 5 seconds like you are trying to hold back urine. Then relax for 5 seconds. Gradually work up to squeezing for 10 seconds. Do 3 sets of 15 repetitions a day, or as directed.      Urinary tract infection with hematuria, site unspecified  -     cefTRIAXone injection 1 g  -     nitrofurantoin, macrocrystal-monohydrate, (MACROBID) 100 MG capsule; Take 1 capsule (100 mg total) by mouth 2 (two) times daily. for 7 days  Dispense: 14 capsule; Refill: 0  -     phenazopyridine  (PYRIDIUM) 200 MG tablet; Take 1 tablet (200 mg total) by mouth 3 (three) times daily as needed for Pain.  Dispense: 9 tablet; Refill: 0    Dysuria  -     POCT Urinalysis, Dipstick, Automated, W/O Scope  -     cefTRIAXone injection 1 g  -     nitrofurantoin, macrocrystal-monohydrate, (MACROBID) 100 MG capsule; Take 1 capsule (100 mg total) by mouth 2 (two) times daily. for 7 days  Dispense: 14 capsule; Refill: 0  -     phenazopyridine (PYRIDIUM) 200 MG tablet; Take 1 tablet (200 mg total) by mouth 3 (three) times daily as needed for Pain.  Dispense: 9 tablet; Refill: 0    History of diabetes mellitus  -     POCT Glucose, Hand-Held Device  -     cefTRIAXone injection 1 g  -     nitrofurantoin, macrocrystal-monohydrate, (MACROBID) 100 MG capsule; Take 1 capsule (100 mg total) by mouth 2 (two) times daily. for 7 days  Dispense: 14 capsule; Refill: 0  -     phenazopyridine (PYRIDIUM) 200 MG tablet; Take 1 tablet (200 mg total) by mouth 3 (three) times daily as needed for Pain.  Dispense: 9 tablet; Refill: 0                         [1]   Social History  Tobacco Use    Smoking status: Never    Smokeless tobacco: Never   Substance Use Topics    Alcohol use: Yes     Comment: Occasionally    Drug use: Never

## 2025-08-18 ENCOUNTER — HOSPITAL ENCOUNTER (OUTPATIENT)
Dept: RADIOLOGY | Facility: HOSPITAL | Age: 55
Discharge: HOME | End: 2025-08-18
Attending: PHYSICIAN ASSISTANT
Payer: COMMERCIAL

## 2025-08-18 DIAGNOSIS — Z12.31 ENCOUNTER FOR SCREENING MAMMOGRAM FOR BREAST CANCER: ICD-10-CM

## 2025-08-18 PROCEDURE — 77067 SCR MAMMO BI INCL CAD: CPT | Mod: TC

## (undated) DEVICE — NDL SYR 10ML 18X1.5 LL BLUNT

## (undated) DEVICE — NDL EPIDURAL TOUHY 18G X3.5

## (undated) DEVICE — CHLORAPREP 10.5 ML APPLICATOR

## (undated) DEVICE — GLOVE SIGNATURE MICRO LTX 6.5

## (undated) DEVICE — DRAPE MEDIUM SHEET 40X70IN

## (undated) DEVICE — CONTRAST ISOVUE M 200 20ML VIL

## (undated) DEVICE — DRAPE UTILITY W/ TAPE 20X30IN

## (undated) DEVICE — SET SMARTSITE EXT SMALLBORE NF

## (undated) DEVICE — SYR EPILOR LUER-LOK LOR 7ML

## (undated) DEVICE — ADAPTER DUAL NSL LUER M-M 7FT

## (undated) DEVICE — Device

## (undated) DEVICE — SYR 3ML LL 18GA 1.5IN

## (undated) DEVICE — NDL HYPO REG 25G X 1 1/2